# Patient Record
Sex: MALE | Race: WHITE | NOT HISPANIC OR LATINO | Employment: OTHER | URBAN - METROPOLITAN AREA
[De-identification: names, ages, dates, MRNs, and addresses within clinical notes are randomized per-mention and may not be internally consistent; named-entity substitution may affect disease eponyms.]

---

## 2017-04-05 RX ORDER — TERAZOSIN 10 MG/1
CAPSULE ORAL
Qty: 30 CAPSULE | Refills: 5 | Status: SHIPPED | OUTPATIENT
Start: 2017-04-05 | End: 2020-08-04 | Stop reason: ALTCHOICE

## 2020-07-28 ENCOUNTER — OFFICE VISIT (OUTPATIENT)
Dept: GASTROENTEROLOGY | Facility: CLINIC | Age: 70
End: 2020-07-28
Payer: OTHER GOVERNMENT

## 2020-07-28 VITALS
HEART RATE: 58 BPM | HEIGHT: 69 IN | WEIGHT: 248 LBS | SYSTOLIC BLOOD PRESSURE: 131 MMHG | DIASTOLIC BLOOD PRESSURE: 67 MMHG | BODY MASS INDEX: 36.73 KG/M2

## 2020-07-28 DIAGNOSIS — Z86.010 HISTORY OF COLON POLYPS: Primary | ICD-10-CM

## 2020-07-28 DIAGNOSIS — K59.00 CONSTIPATION, UNSPECIFIED CONSTIPATION TYPE: ICD-10-CM

## 2020-07-28 DIAGNOSIS — Z01.818 PRE-OP TESTING: ICD-10-CM

## 2020-07-28 PROCEDURE — 99204 OFFICE O/P NEW MOD 45 MIN: CPT | Mod: S$PBB,,, | Performed by: INTERNAL MEDICINE

## 2020-07-28 PROCEDURE — 99204 PR OFFICE/OUTPT VISIT, NEW, LEVL IV, 45-59 MIN: ICD-10-PCS | Mod: S$PBB,,, | Performed by: INTERNAL MEDICINE

## 2020-07-28 PROCEDURE — 99999 PR PBB SHADOW E&M-EST. PATIENT-LVL III: CPT | Mod: PBBFAC,,, | Performed by: INTERNAL MEDICINE

## 2020-07-28 PROCEDURE — 99999 PR PBB SHADOW E&M-EST. PATIENT-LVL III: ICD-10-PCS | Mod: PBBFAC,,, | Performed by: INTERNAL MEDICINE

## 2020-07-28 PROCEDURE — 99213 OFFICE O/P EST LOW 20 MIN: CPT | Mod: PBBFAC,PO | Performed by: INTERNAL MEDICINE

## 2020-07-28 NOTE — PROGRESS NOTES
"Subjective:       Patient ID: Eron Turk is a 69 y.o. male.    This patient is new to me.    Chief Complaint: Constipation    Patient seen for constipation, onset recent, with stool frequency usually once or twice per day but no BM in the last day or so, mild in severity, with stool form being normal.  Associated signs and symptoms include abdominal bloating and alleviating/exacerbating factors include none.   Last colonoscopy was about 8 years ago per patient and he had some polyps noted.  Per his wife who was present, size and histology of polyps is unknown.  No bleeding, weight changes, or abdominal pain.  No other acute complaints.        Review of Systems   Constitutional: Negative for chills, fatigue and fever.   HENT: Negative for trouble swallowing.    Respiratory: Negative for cough, shortness of breath and wheezing.    Cardiovascular: Negative for chest pain and palpitations.   Gastrointestinal: Positive for constipation (mild, occasional). Negative for abdominal pain, diarrhea, nausea and vomiting.   Musculoskeletal: Negative for arthralgias and myalgias.   Integumentary:  Negative for color change and rash.   Neurological: Negative for dizziness, weakness and numbness.   Psychiatric/Behavioral: Negative for confusion. The patient is not nervous/anxious.    All other systems reviewed and are negative.        Objective:       Vitals:    07/28/20 1330   BP: 131/67   Pulse: (!) 58   Weight: 112.5 kg (248 lb 0.3 oz)   Height: 5' 9" (1.753 m)       Physical Exam  Constitutional:       Appearance: He is well-developed.   HENT:      Head: Normocephalic and atraumatic.   Eyes:      General: No scleral icterus.     Pupils: Pupils are equal, round, and reactive to light.   Neck:      Musculoskeletal: Normal range of motion and neck supple.      Thyroid: No thyromegaly.   Cardiovascular:      Rate and Rhythm: Normal rate and regular rhythm.      Heart sounds: No murmur.   Pulmonary:      Effort: Pulmonary " effort is normal.      Breath sounds: Normal breath sounds. No wheezing.   Abdominal:      General: Bowel sounds are normal. There is no distension.      Palpations: Abdomen is soft.      Tenderness: There is no abdominal tenderness.      Comments: obese   Lymphadenopathy:      Cervical: No cervical adenopathy.   Skin:     General: Skin is warm and dry.      Findings: No erythema or rash.   Neurological:      Mental Status: He is alert and oriented to person, place, and time.   Psychiatric:         Behavior: Behavior normal.         Lab Results   Component Value Date    WBC 8.92 05/02/2016    HGB 12.6 (L) 05/02/2016    HCT 36.9 (L) 05/02/2016    MCV 87 05/02/2016     05/02/2016         CMP  Sodium   Date Value Ref Range Status   05/02/2016 138 136 - 145 mmol/L Final     Potassium   Date Value Ref Range Status   05/02/2016 3.1 (L) 3.5 - 5.1 mmol/L Final     Chloride   Date Value Ref Range Status   05/02/2016 102 95 - 110 mmol/L Final     CO2   Date Value Ref Range Status   05/02/2016 29 23 - 29 mmol/L Final     Glucose   Date Value Ref Range Status   05/02/2016 95 70 - 110 mg/dL Final     BUN, Bld   Date Value Ref Range Status   05/02/2016 13 8 - 23 mg/dL Final     Creatinine   Date Value Ref Range Status   05/02/2016 0.9 0.5 - 1.4 mg/dL Final     Calcium   Date Value Ref Range Status   05/02/2016 8.6 (L) 8.7 - 10.5 mg/dL Final     Total Protein   Date Value Ref Range Status   02/12/2016 6.5 6.0 - 8.4 g/dL Final     Albumin   Date Value Ref Range Status   02/12/2016 3.3 (L) 3.5 - 5.2 g/dL Final     Total Bilirubin   Date Value Ref Range Status   02/12/2016 0.8 0.1 - 1.0 mg/dL Final     Comment:     For infants and newborns, interpretation of results should be based  on gestational age, weight and in agreement with clinical  observations.  Premature Infant recommended reference ranges:  Up to 24 hours.............<8.0 mg/dL  Up to 48 hours............<12.0 mg/dL  3-5 days..................<15.0 mg/dL  6-29  days.................<15.0 mg/dL       Alkaline Phosphatase   Date Value Ref Range Status   02/12/2016 63 55 - 135 U/L Final     AST   Date Value Ref Range Status   02/12/2016 15 10 - 40 U/L Final     ALT   Date Value Ref Range Status   02/12/2016 16 10 - 44 U/L Final     Anion Gap   Date Value Ref Range Status   05/02/2016 7 (L) 8 - 16 mmol/L Final     eGFR if    Date Value Ref Range Status   05/02/2016 >60 >60 mL/min/1.73 m^2 Final     eGFR if non    Date Value Ref Range Status   05/02/2016 >60 >60 mL/min/1.73 m^2 Final     Comment:     Calculation used to obtain the estimated glomerular filtration  rate (eGFR) is the CKD-EPI equation. Since race is unknown   in our information system, the eGFR values for   -American and Non--American patients are given   for each creatinine result.         Further history was obtained from the patient's wife who was present throughout the interview and states that polyp type is unknown.  History is otherwise as above in the HPI.       Assessment:       1. History of colon polyps    2. Constipation, unspecified constipation type        Plan:       1.  Schedule colonoscopy.  Check routine labs day of scope.  2.  PRN stool softener  3.  Further recommendations to follow after above.

## 2020-08-02 ENCOUNTER — LAB VISIT (OUTPATIENT)
Dept: PRIMARY CARE CLINIC | Facility: CLINIC | Age: 70
End: 2020-08-02
Payer: OTHER GOVERNMENT

## 2020-08-02 DIAGNOSIS — Z01.818 PRE-OP TESTING: ICD-10-CM

## 2020-08-02 PROCEDURE — U0003 INFECTIOUS AGENT DETECTION BY NUCLEIC ACID (DNA OR RNA); SEVERE ACUTE RESPIRATORY SYNDROME CORONAVIRUS 2 (SARS-COV-2) (CORONAVIRUS DISEASE [COVID-19]), AMPLIFIED PROBE TECHNIQUE, MAKING USE OF HIGH THROUGHPUT TECHNOLOGIES AS DESCRIBED BY CMS-2020-01-R: HCPCS

## 2020-08-03 ENCOUNTER — TELEPHONE (OUTPATIENT)
Dept: GASTROENTEROLOGY | Facility: CLINIC | Age: 70
End: 2020-08-03

## 2020-08-03 ENCOUNTER — HOSPITAL ENCOUNTER (EMERGENCY)
Facility: HOSPITAL | Age: 70
Discharge: HOME | End: 2020-08-03
Attending: EMERGENCY MEDICINE
Payer: OTHER GOVERNMENT

## 2020-08-03 VITALS
HEART RATE: 81 BPM | BODY MASS INDEX: 36.62 KG/M2 | WEIGHT: 248 LBS | OXYGEN SATURATION: 96 % | DIASTOLIC BLOOD PRESSURE: 77 MMHG | TEMPERATURE: 98 F | SYSTOLIC BLOOD PRESSURE: 168 MMHG | RESPIRATION RATE: 18 BRPM

## 2020-08-03 DIAGNOSIS — R33.9 URINARY RETENTION: Primary | ICD-10-CM

## 2020-08-03 LAB
ANION GAP SERPL CALC-SCNC: 11 MMOL/L (ref 8–16)
BASOPHILS # BLD AUTO: 0.04 K/UL (ref 0–0.2)
BASOPHILS NFR BLD: 0.4 % (ref 0–1.9)
BILIRUB UR QL STRIP: NEGATIVE
BUN SERPL-MCNC: 13 MG/DL (ref 8–23)
CALCIUM SERPL-MCNC: 8.9 MG/DL (ref 8.7–10.5)
CHLORIDE SERPL-SCNC: 106 MMOL/L (ref 95–110)
CLARITY UR: CLEAR
CO2 SERPL-SCNC: 24 MMOL/L (ref 23–29)
COLOR UR: YELLOW
CREAT SERPL-MCNC: 0.9 MG/DL (ref 0.5–1.4)
DIFFERENTIAL METHOD: ABNORMAL
EOSINOPHIL # BLD AUTO: 0 K/UL (ref 0–0.5)
EOSINOPHIL NFR BLD: 0.4 % (ref 0–8)
ERYTHROCYTE [DISTWIDTH] IN BLOOD BY AUTOMATED COUNT: 12.2 % (ref 11.5–14.5)
EST. GFR  (AFRICAN AMERICAN): >60 ML/MIN/1.73 M^2
EST. GFR  (NON AFRICAN AMERICAN): >60 ML/MIN/1.73 M^2
GLUCOSE SERPL-MCNC: 114 MG/DL (ref 70–110)
GLUCOSE UR QL STRIP: NEGATIVE
HCT VFR BLD AUTO: 40.8 % (ref 40–54)
HGB BLD-MCNC: 13.6 G/DL (ref 14–18)
HGB UR QL STRIP: NEGATIVE
IMM GRANULOCYTES # BLD AUTO: 0.08 K/UL (ref 0–0.04)
IMM GRANULOCYTES NFR BLD AUTO: 0.8 % (ref 0–0.5)
KETONES UR QL STRIP: NEGATIVE
LEUKOCYTE ESTERASE UR QL STRIP: NEGATIVE
LYMPHOCYTES # BLD AUTO: 1.5 K/UL (ref 1–4.8)
LYMPHOCYTES NFR BLD: 14.5 % (ref 18–48)
MCH RBC QN AUTO: 31.9 PG (ref 27–31)
MCHC RBC AUTO-ENTMCNC: 33.3 G/DL (ref 32–36)
MCV RBC AUTO: 96 FL (ref 82–98)
MONOCYTES # BLD AUTO: 0.9 K/UL (ref 0.3–1)
MONOCYTES NFR BLD: 8.6 % (ref 4–15)
NEUTROPHILS # BLD AUTO: 7.9 K/UL (ref 1.8–7.7)
NEUTROPHILS NFR BLD: 75.3 % (ref 38–73)
NITRITE UR QL STRIP: NEGATIVE
NRBC BLD-RTO: 0 /100 WBC
PH UR STRIP: 6 [PH] (ref 5–8)
PLATELET # BLD AUTO: 159 K/UL (ref 150–350)
PMV BLD AUTO: 10.4 FL (ref 9.2–12.9)
POTASSIUM SERPL-SCNC: 4.5 MMOL/L (ref 3.5–5.1)
PROT UR QL STRIP: NEGATIVE
RBC # BLD AUTO: 4.26 M/UL (ref 4.6–6.2)
SARS-COV-2 RNA RESP QL NAA+PROBE: NOT DETECTED
SODIUM SERPL-SCNC: 141 MMOL/L (ref 136–145)
SP GR UR STRIP: 1.01 (ref 1–1.03)
URN SPEC COLLECT METH UR: NORMAL
UROBILINOGEN UR STRIP-ACNC: NEGATIVE EU/DL
WBC # BLD AUTO: 10.51 K/UL (ref 3.9–12.7)

## 2020-08-03 PROCEDURE — 99283 EMERGENCY DEPT VISIT LOW MDM: CPT

## 2020-08-03 PROCEDURE — 85025 COMPLETE CBC W/AUTO DIFF WBC: CPT

## 2020-08-03 PROCEDURE — 81003 URINALYSIS AUTO W/O SCOPE: CPT

## 2020-08-03 PROCEDURE — 36415 COLL VENOUS BLD VENIPUNCTURE: CPT

## 2020-08-03 PROCEDURE — 80048 BASIC METABOLIC PNL TOTAL CA: CPT

## 2020-08-03 PROCEDURE — 51702 INSERT TEMP BLADDER CATH: CPT

## 2020-08-03 NOTE — ED PROVIDER NOTES
Encounter Date: 8/3/2020    SCRIBE #1 NOTE: I, Alma Barron, am scribing for, and in the presence of, rBian Guajardo MD.       History     Chief Complaint   Patient presents with    Urinary Retention     beginning yesterday, some incontinence en route     Time seen by provider: 5:46 PM on 2020    Eron Turk is a 69 y.o. male who presents to the ED with an onset of urinary retention for 2 days secondary to an enlarged prostate. He also c/o mild incontinence en route. The patient denies fever or any other symptoms at this time. Pertinent PMHx includes BPH, HTN, and PUD. Pertinent PSHx includes hernia repair. NKDA.      The history is provided by the patient.     Review of patient's allergies indicates:  No Known Allergies  Past Medical History:   Diagnosis Date    Arthritis     BPH (benign prostatic hypertrophy)     Encounter for blood transfusion     Hypertension     PTSD (post-traumatic stress disorder)     PUD (peptic ulcer disease)      Past Surgical History:   Procedure Laterality Date    ANKLE SURGERY Left     HERNIA REPAIR      JOINT REPLACEMENT      right    stomach ulcer surgery       No family history on file.  Social History     Tobacco Use    Smoking status: Former Smoker     Packs/day: 2.00     Years: 13.00     Pack years: 26.00     Quit date: 1979     Years since quittin.5   Substance Use Topics    Alcohol use: Yes     Alcohol/week: 6.0 standard drinks     Types: 6 Cans of beer per week     Comment: occ    Drug use: No     Review of Systems   Constitutional: Negative for fever.   Respiratory: Negative for shortness of breath.    Genitourinary: Positive for difficulty urinating. Negative for flank pain.        Positive for mild incontinence.    Musculoskeletal: Negative for gait problem.   Neurological: Negative for weakness.   Psychiatric/Behavioral: Negative for confusion.       Physical Exam     Initial Vitals [20 1656]   BP Pulse Resp Temp SpO2   (!)  168/77 81 18 97.5 °F (36.4 °C) 96 %      MAP       --         Physical Exam    Nursing note and vitals reviewed.  Constitutional: He appears well-developed and well-nourished.   HENT:   Head: Normocephalic and atraumatic.   Eyes: Conjunctivae are normal.   Neck: Normal range of motion. Neck supple.   Cardiovascular: Normal rate, regular rhythm and normal heart sounds. Exam reveals no gallop and no friction rub.    No murmur heard.  Pulmonary/Chest: Breath sounds normal. No respiratory distress. He has no wheezes. He has no rhonchi. He has no rales.   Abdominal: Soft. He exhibits distension. There is no abdominal tenderness. A hernia is present. Hernia confirmed positive in the ventral area (reducible).   Musculoskeletal: Normal range of motion.   Neurological: He is alert and oriented to person, place, and time.   Skin: Skin is warm and dry.   Psychiatric: He has a normal mood and affect.         ED Course   Procedures  Labs Reviewed   BASIC METABOLIC PANEL - Abnormal; Notable for the following components:       Result Value    Glucose 114 (*)     All other components within normal limits   CBC W/ AUTO DIFFERENTIAL - Abnormal; Notable for the following components:    RBC 4.26 (*)     Hemoglobin 13.6 (*)     Mean Corpuscular Hemoglobin 31.9 (*)     Immature Granulocytes 0.8 (*)     Gran # (ANC) 7.9 (*)     Immature Grans (Abs) 0.08 (*)     Gran% 75.3 (*)     Lymph% 14.5 (*)     All other components within normal limits   URINALYSIS, REFLEX TO URINE CULTURE    Narrative:     Specimen Source->Urine          Imaging Results    None          Medical Decision Making:   History:   Old Medical Records: I decided to obtain old medical records.  Clinical Tests:   Lab Tests: Ordered and Reviewed  ED Management:  69-year-old male presents with urinary retention.  Catheter was placed with greater than 1500 cc of urine.  He has no evidence of renal insufficiency.  There is no evidence of infection.  Catheter remains in place with  referral to Urology.       APC / Resident Notes:   I, Dr. Brian Guajardo III, personally performed the services described in this documentation. All medical record entries made by the scribe were at my direction and in my presence.  I have reviewed the chart and agree that the record reflects my personal performance and is accurate and complete       Scribe Attestation:   Scribe #1: I performed the above scribed service and the documentation accurately describes the services I performed. I attest to the accuracy of the note.                          Clinical Impression:       ICD-10-CM ICD-9-CM   1. Urinary retention  R33.9 788.20         Disposition:   Disposition: Discharged  Condition: Stable                        Brian Guajardo III, MD  08/03/20 1950

## 2020-08-03 NOTE — TELEPHONE ENCOUNTER
Called back and cancelled procedure    ----- Message from Heavenly Alejandra sent at 8/3/2020  4:10 PM CDT -----  Type: Needs Medical Advice  Who Called:  Brielle Hernandez (Significant other)  Best Call Back Number: [phone]  Additional Information: states that the patient has procedure scheduled for 08/05/2020. States that the patient is having issues with his prostate and unable to urinate. Requesting a call back today to r/s. thanks

## 2020-08-04 ENCOUNTER — OFFICE VISIT (OUTPATIENT)
Dept: UROLOGY | Facility: CLINIC | Age: 70
End: 2020-08-04
Payer: OTHER GOVERNMENT

## 2020-08-04 VITALS
BODY MASS INDEX: 35.75 KG/M2 | HEIGHT: 69 IN | DIASTOLIC BLOOD PRESSURE: 78 MMHG | SYSTOLIC BLOOD PRESSURE: 157 MMHG | HEART RATE: 64 BPM | WEIGHT: 241.38 LBS

## 2020-08-04 DIAGNOSIS — N39.41 URGE INCONTINENCE: ICD-10-CM

## 2020-08-04 DIAGNOSIS — R33.9 URINARY RETENTION: Primary | ICD-10-CM

## 2020-08-04 DIAGNOSIS — K59.00 CONSTIPATION, UNSPECIFIED CONSTIPATION TYPE: ICD-10-CM

## 2020-08-04 DIAGNOSIS — N40.0 BENIGN NON-NODULAR PROSTATIC HYPERPLASIA WITHOUT LOWER URINARY TRACT SYMPTOMS: ICD-10-CM

## 2020-08-04 PROCEDURE — 99204 OFFICE O/P NEW MOD 45 MIN: CPT | Mod: S$PBB,25,, | Performed by: UROLOGY

## 2020-08-04 PROCEDURE — 99214 OFFICE O/P EST MOD 30 MIN: CPT | Mod: PBBFAC,PN,25 | Performed by: UROLOGY

## 2020-08-04 PROCEDURE — 81002 URINALYSIS NONAUTO W/O SCOPE: CPT | Mod: PBBFAC,PN | Performed by: UROLOGY

## 2020-08-04 PROCEDURE — 99204 PR OFFICE/OUTPT VISIT, NEW, LEVL IV, 45-59 MIN: ICD-10-PCS | Mod: S$PBB,25,, | Performed by: UROLOGY

## 2020-08-04 PROCEDURE — 99999 PR PBB SHADOW E&M-EST. PATIENT-LVL IV: ICD-10-PCS | Mod: PBBFAC,,, | Performed by: UROLOGY

## 2020-08-04 PROCEDURE — 51700 IRRIGATION OF BLADDER: CPT | Mod: S$PBB,,, | Performed by: UROLOGY

## 2020-08-04 PROCEDURE — 51700 PR IRRIGATION, BLADDER: ICD-10-PCS | Mod: S$PBB,,, | Performed by: UROLOGY

## 2020-08-04 PROCEDURE — 99999 PR PBB SHADOW E&M-EST. PATIENT-LVL IV: CPT | Mod: PBBFAC,,, | Performed by: UROLOGY

## 2020-08-04 PROCEDURE — 51700 IRRIGATION OF BLADDER: CPT | Mod: PBBFAC,PN | Performed by: UROLOGY

## 2020-08-04 RX ORDER — LIDOCAINE HYDROCHLORIDE 20 MG/ML
JELLY TOPICAL
Qty: 30 ML | Refills: 0 | Status: SHIPPED | OUTPATIENT
Start: 2020-08-04

## 2020-08-04 RX ORDER — TAMSULOSIN HYDROCHLORIDE 0.4 MG/1
0.8 CAPSULE ORAL
Qty: 180 CAPSULE | Refills: 3 | Status: ON HOLD | OUTPATIENT
Start: 2020-08-04 | End: 2020-09-14 | Stop reason: HOSPADM

## 2020-08-04 NOTE — PATIENT INSTRUCTIONS
fiber gummies. Take 2 fiber gummies a day for constipation.    ky jelly/vaseline/neosporin and apply to catheter tip twice a day. Clean off twice a day.   Discontinue oxybutynin/ditropan   Discontinue terazosin  Start tamsulosin 0.8mg (2 pills)before bedtime to help relax prostate  Take amlodipine in the morning (do not take with tamsulosin).    Return next Monday for voiding trial. Wife will cut across catheter where it is one tube. She will do this 9 hours before her appt/nurse visit for residual. Steph will call with time and we will check the residual here. Either he has to void here and we will check what's left or if he can't void, then we will check residual.    If the residual is >300 need another catheter. Wife here, says he wont' let her do cic so will need indwelling catheter until he can have a cystoscopy and trus and possible urodynamics    Also needs a psa this week. May be elevated bc of catheter but at least need baseline.     F/u in 2 months with me with pvr and likely psa  If no follow-ups are made, call office and make a f/u   Needs colonoscopy then we can focus on prostate again     Post Op Catheter Care:   · While the catheter is in clean the tip of the penis and the catheter, where it comes out of the penis, at least once daily and up to 2-3x a day and apply lidocaine jelly or Ky Jelly up to 4x a day to where the catheter exits the penis.   · It is ok to disconnect the sharma from the tubing prior to taking a shower but before reconnecting wipe each end with an alcohol wipe.   · Expect blood in urine with the catheter, especially with movement or if you are straining to have a bowel movement.  · Try not to let the catheter pull or tug.

## 2020-08-04 NOTE — PROGRESS NOTES
[facility] Progress Note - [place]  [facility]  [facility]: inactive    Chief Complaint: Hospital Follow Up (sharma cath) and Urinary Retention      Subjective:        HPI: Eron Turk is a 69 y.o. male presents with     Interval history 10/14/16:     He recently moved from [place]. He found a testicular lump and underwent a scrotal US at [facility]. He was told he had a cyst in his scroum and on last visit he was found to have an epididymal cyst.      He also c/o occ weak stream. occ hesisistancy. He has frequency q1-2 hours. Nocturia 5x a night. He is on hytrin which he's been on for the past 10 years. occ straining. He has occ UUI. He does not feel like he empties his bladder. i started ditropan at last visit and he states that he is happy with his symptoms. Feels like he is able to hold his urine, denies any leakage. Nocturia decreased to 2-3x a night. Frequency q3-4 hours.     I scheduled him for cysto/trus on 9/12/16. Volume 33.7g and he was found to have an enlarged median lobe. Mod bilobar hypertrophy.     Uroflow results : Mean flow: 4mL/s. Voided volume: 232 mL. PVR 174cc     Plan: pt not interested in suregery. cont ditropan, continue terazosin. F/u in 6 months.     Interval history 8/4/20:     He went to ER with 2d history of urinary retention associated with full bladder and pain with urge. Has been on terazosin and ditropan 10mg XL. He was supposed to f/u in 2016 but they didn't know. He's been having increasing urgency and frequency with UUI requiring depends. Nocturia 4-5x. Wearing 3 depends a day.    psa history - no family hx of prostate cancer  8/3/20  Cath residual: 1500cc., 8/4/20: difficult to palpate prostate  10/14/16 UF: avg flow 4mL/s, voided vol: 232, pvr: 174cc  9/12/16 Cysto/trus: 33.7g   8/16/16 0.87    Urine history:  8/3/20 Cath: neg       Past Medical History:   Diagnosis Date    Arthritis     BPH (benign prostatic hypertrophy)     Encounter for  blood transfusion     Hypertension     PTSD (post-traumatic stress disorder)     PUD (peptic ulcer disease)      Past Surgical History:   Procedure Laterality Date    ANKLE SURGERY Left     HERNIA REPAIR      JOINT REPLACEMENT      right    stomach ulcer surgery         Cardiology: none  Colonoscopy:      [facility] Hx:   malignancies: No  . Mother  a 86. Father  a 90  kidney stones: Yes - little brother      Soc Hx:  No tobacco.  5 to 6 beers per day - alcohol  [place] in [place]  :unmarried but with partner Brielle  Children: 3  Occupation: and      Allergies:  Review of patient's allergies indicates no known allergies.     Medications: reviewed   Anticoagulation: Yes - asa 81mg daily    Urologic meds: ditropan, hytrin 10mg  Anticoagulation: Yes - asa 81mg    Objective:     Vitals:    20 1613   BP: (!) 157/78   Pulse: 64       Physical Exam:  General:WDWN in NAD  Neurologic: CN grossly normal. Normal sensation.   Psychiatric: awake, alert and oriented x 3. Mood and affect normal. Cooperative.  Eyes: PERRLA, normal conjunctiva  Respiratory: no increased work on breathing. No wheezing.   Cardiovascular: No obvious extremity edema. Warm and well perfused.  GI: no obvious stomach distension  Musculoskeletal: normal range of motion of bilateral upper extremities. Normal muscle strength and tone.  Skin: no obvious rashes or lesions. No tightening of skin noted.    Repositioned catheter stat lock and irrigated to make sure draining    Labs:  Recent Labs   Lab 20  1837   WBC 10.51   Hemoglobin 13.6 L   Hematocrit 40.8   Platelets 159   ]  Recent Labs   Lab 20  1837   Sodium 141   Potassium 4.5   Chloride 106   CO2 24   BUN, Bld 13   Creatinine 0.9   Glucose 114 H   Calcium 8.9   ]    Lab Results   Component Value Date    HGBA1C 5.6 2016       Path: See hpi for recent   none    Rads: See hpi for recent   none    Assessment:       1.  Urinary retention    2. Benign non-nodular prostatic hyperplasia without lower urinary tract symptoms    3. Constipation, unspecified constipation type    4. Urge incontinence        Plan:     Retention likely from bph, constipation and oab med     fiber gummies. Take 2 fiber gummies a day for constipation.    ky jelly/vaseline/neosporin and apply to catheter tip twice a day. Clean off twice a day.   Discontinue oxybutynin/ditropan    Discontinue terazosin  Start tamsulosin 0.8mg (2 pills)before bedtime to help relax prostate  Take amlodipine in the morning (do not take with tamsulosin).    Return next Monday for voiding trial. Wife will cut across catheter where it is one tube. She will do this 9 hours before her appt/nurse visit for residual. Steph will call with time and we will check the residual here. Either he has to void here and we will check what's left or if he can't void, then we will check residual.    If the residual is >300 need another catheter. Wife here, says he wont' let her do cic so will need indwelling catheter until he can have a cystoscopy and trus and possible urodynamics    Also needs a psa this week. May be elevated bc of catheter but at least need baseline.     Will likely need prostate procedure. With urge to void suspect his obstruction from his prostate. Will need a repeat cysto trus prior to any procedure. May need urodynamics as well if prostate small on cysto.     F/u in 1 months with me with pvr and likely psa  If no follow-ups are made, call office and make a f/u   Needs colonoscopy then we can focus on prostate again

## 2020-08-04 NOTE — Clinical Note
Psa this week.  Call pt and give them time on Monday pm for pvr  She will remove catheter at home 9 hours prior   F/u in 1 month with me pvr

## 2020-08-04 NOTE — ED NOTES
Leg bag attached to pt sharma catheter and pt educated on care and need for F/U.  Pt acknowledged understanding and acceptance.

## 2020-08-10 ENCOUNTER — CLINICAL SUPPORT (OUTPATIENT)
Dept: UROLOGY | Facility: CLINIC | Age: 70
End: 2020-08-10
Payer: OTHER GOVERNMENT

## 2020-08-10 ENCOUNTER — APPOINTMENT (OUTPATIENT)
Dept: LAB | Facility: HOSPITAL | Age: 70
End: 2020-08-10
Attending: UROLOGY
Payer: OTHER GOVERNMENT

## 2020-08-10 DIAGNOSIS — R33.9 URINARY RETENTION: Primary | ICD-10-CM

## 2020-08-10 LAB
BACTERIA #/AREA URNS HPF: ABNORMAL /HPF
BILIRUB UR QL STRIP: NEGATIVE
CLARITY UR: CLEAR
COLOR UR: YELLOW
GLUCOSE UR QL STRIP: NEGATIVE
HGB UR QL STRIP: ABNORMAL
HYALINE CASTS #/AREA URNS LPF: 0 /LPF
KETONES UR QL STRIP: NEGATIVE
LEUKOCYTE ESTERASE UR QL STRIP: ABNORMAL
MICROSCOPIC COMMENT: ABNORMAL
NITRITE UR QL STRIP: NEGATIVE
PH UR STRIP: 6 [PH] (ref 5–8)
POC RESIDUAL URINE VOLUME: 287 ML (ref 0–100)
PROT UR QL STRIP: ABNORMAL
RBC #/AREA URNS HPF: >100 /HPF (ref 0–4)
SP GR UR STRIP: 1.02 (ref 1–1.03)
SQUAMOUS #/AREA URNS HPF: 2 /HPF
URN SPEC COLLECT METH UR: ABNORMAL
UROBILINOGEN UR STRIP-ACNC: NEGATIVE EU/DL
WBC #/AREA URNS HPF: 18 /HPF (ref 0–5)

## 2020-08-10 PROCEDURE — 51703 PR INSERTION OF TEMPORARY INDWELLING BLADDER CATHETERIZATION, COMPLICA: ICD-10-PCS | Mod: S$PBB,,, | Performed by: UROLOGY

## 2020-08-10 PROCEDURE — 87086 URINE CULTURE/COLONY COUNT: CPT

## 2020-08-10 PROCEDURE — 51703 INSERT BLADDER CATH COMPLEX: CPT | Mod: S$PBB,,, | Performed by: UROLOGY

## 2020-08-10 PROCEDURE — 99499 UNLISTED E&M SERVICE: CPT | Mod: S$PBB,,, | Performed by: UROLOGY

## 2020-08-10 PROCEDURE — 87077 CULTURE AEROBIC IDENTIFY: CPT

## 2020-08-10 PROCEDURE — 87088 URINE BACTERIA CULTURE: CPT

## 2020-08-10 PROCEDURE — 51703 INSERT BLADDER CATH COMPLEX: CPT | Mod: PBBFAC,PN

## 2020-08-10 PROCEDURE — 99499 NO LOS: ICD-10-PCS | Mod: S$PBB,,, | Performed by: UROLOGY

## 2020-08-10 PROCEDURE — 51798 US URINE CAPACITY MEASURE: CPT | Mod: PBBFAC,PN

## 2020-08-10 PROCEDURE — 81000 URINALYSIS NONAUTO W/SCOPE: CPT

## 2020-08-10 PROCEDURE — 87186 SC STD MICRODIL/AGAR DIL: CPT

## 2020-08-10 NOTE — PROGRESS NOTES
Catheter removed at 6am  Has urge to void  pvr by scan: 287  denise will place catheter and record output    Pt will need:  psa this week (Friday preferably)  Cath ua sent for ua reflexive today  F/u next thurs or following thurs  For a video visit to discuss plan

## 2020-08-10 NOTE — PROGRESS NOTES
Catheter removed this am at 6:00 with no difficulties.   Patient has not been able to urinate since catheter removed.    Patient cannot urinate now.    Bladder scan is 287ml  Reported to Dr. Dowell.    Written orders to place 16Fr coude catheter.    Send cath urine for ua/reflex  VV in 1-2 weeks.        Patient draped and prepped in sterile fashion.   16Fr coude catheter placed into the bladder with no difficulty.    Balloon filled with 10ml saline  Bladder drained of 350ml tea colored urine.  Catheter attached to leg bag.    Leg bag secured to right leg with velcro.    Scheduled VV.      Patient left the office in satisfactory condition.

## 2020-08-12 ENCOUNTER — LAB VISIT (OUTPATIENT)
Dept: LAB | Facility: HOSPITAL | Age: 70
End: 2020-08-12
Attending: UROLOGY
Payer: OTHER GOVERNMENT

## 2020-08-12 DIAGNOSIS — N40.0 BENIGN NON-NODULAR PROSTATIC HYPERPLASIA WITHOUT LOWER URINARY TRACT SYMPTOMS: ICD-10-CM

## 2020-08-12 LAB — COMPLEXED PSA SERPL-MCNC: 0.64 NG/ML (ref 0–4)

## 2020-08-12 PROCEDURE — 84153 ASSAY OF PSA TOTAL: CPT

## 2020-08-12 PROCEDURE — 36415 COLL VENOUS BLD VENIPUNCTURE: CPT

## 2020-08-13 LAB — BACTERIA UR CULT: ABNORMAL

## 2020-08-19 ENCOUNTER — TELEPHONE (OUTPATIENT)
Dept: UROLOGY | Facility: CLINIC | Age: 70
End: 2020-08-19

## 2020-08-19 ENCOUNTER — DOCUMENTATION ONLY (OUTPATIENT)
Dept: UROLOGY | Facility: CLINIC | Age: 70
End: 2020-08-19

## 2020-08-19 NOTE — TELEPHONE ENCOUNTER
----- Message from Shira Burt sent at 8/19/2020 10:49 AM CDT -----  Regarding: appointment  Contact: wife  Type: Needs Medical Advice  Who Called:  wife-Brielle Hernandez  Symptoms (please be specific):    How long has patient had these symptoms:    Pharmacy name and phone #:    Best Call Back Number: [phone]  Additional Information: Patient's wife requesting to change VV to in office visit patient has a catheter

## 2020-08-19 NOTE — TELEPHONE ENCOUNTER
Spoke with patient's wife informed her per  patient should have virtual visit tomorrow or next Thursday to discuss plan. Wife verbally voiced understanding.

## 2020-08-20 ENCOUNTER — OFFICE VISIT (OUTPATIENT)
Dept: UROLOGY | Facility: CLINIC | Age: 70
End: 2020-08-20
Payer: COMMERCIAL

## 2020-08-20 ENCOUNTER — TELEPHONE (OUTPATIENT)
Dept: UROLOGY | Facility: CLINIC | Age: 70
End: 2020-08-20

## 2020-08-20 DIAGNOSIS — N40.0 BENIGN NON-NODULAR PROSTATIC HYPERPLASIA WITHOUT LOWER URINARY TRACT SYMPTOMS: Primary | ICD-10-CM

## 2020-08-20 DIAGNOSIS — R33.9 URINARY RETENTION: ICD-10-CM

## 2020-08-20 DIAGNOSIS — Z01.818 PREOP EXAMINATION: ICD-10-CM

## 2020-08-20 PROCEDURE — 99213 PR OFFICE/OUTPT VISIT, EST, LEVL III, 20-29 MIN: ICD-10-PCS | Mod: 95,,, | Performed by: UROLOGY

## 2020-08-20 PROCEDURE — 99213 OFFICE O/P EST LOW 20 MIN: CPT | Mod: 95,,, | Performed by: UROLOGY

## 2020-08-20 RX ORDER — LIDOCAINE HYDROCHLORIDE 20 MG/ML
JELLY TOPICAL ONCE
Status: CANCELLED | OUTPATIENT
Start: 2020-08-20 | End: 2020-08-20

## 2020-08-20 RX ORDER — NITROFURANTOIN (MACROCRYSTALS) 100 MG/1
100 CAPSULE ORAL EVERY 12 HOURS
Qty: 14 CAPSULE | Refills: 0 | Status: SHIPPED | OUTPATIENT
Start: 2020-08-20 | End: 2020-08-20 | Stop reason: SDUPTHER

## 2020-08-20 RX ORDER — NITROFURANTOIN (MACROCRYSTALS) 100 MG/1
100 CAPSULE ORAL EVERY 12 HOURS
Qty: 14 CAPSULE | Refills: 0 | Status: SHIPPED | OUTPATIENT
Start: 2020-08-20 | End: 2020-08-24 | Stop reason: ALTCHOICE

## 2020-08-20 NOTE — TELEPHONE ENCOUNTER
----- Message from Mary Hebert sent at 8/20/2020  3:23 PM CDT -----  Pt called stating no one reached out to him for his virtual appointment please reach out to pt at [phone]

## 2020-08-20 NOTE — PATIENT INSTRUCTIONS
He tried to void just 2 days after starting flomax 0.8mg  Recommend one more voiding trial and then schedule cysto trus to evaluate prostate size this Monday August 24th.   Will give voiding trial on Monday at time of procedure. If he cannot void or empty will need to replace catheter.   With the urge to urinate I suspect bph    preop covid 3d prior  (AUGUST 20)  Start macrobid twice a day for 7 days- prescription has to be handwritten. Sent to [facility]

## 2020-08-20 NOTE — PROGRESS NOTES
The patient location is: home  Date of Service: 08/20/2020   The chief complaint leading to consultation is: see hpi and visit diagnosis  Visit type: Virtual visit with Real Time synchronous AUDIO and VIDEO     Each patient to whom he or she provides medical services by telemedicine is:    (1) informed of the relationship between the physician and patient and the respective role of any other health care provider with respect to management of the patient; and   (2) notified that he or she may decline to receive medical services by telemedicine and may withdraw from such care at any time.  * due to inability to access [employer] VV platform, video/audio provided via HIPPA-compliant mig33 video call platform      [facility] - [place]  [facility]    Chief Complaint: No chief complaint on file.      Subjective:        HPI: Eorn Turk is a 69 y.o. male presents with     Interval history 10/14/16:     He recently moved from [place]. He found a testicular lump and underwent a scrotal US at [facility]. He was told he had a cyst in his scroum and on last visit he was found to have an epididymal cyst.      He also c/o occ weak stream. occ hesisistancy. He has frequency q1-2 hours. Nocturia 5x a night. He is on hytrin which he's been on for the past 10 years. occ straining. He has occ UUI. He does not feel like he empties his bladder. i started ditropan at last visit and he states that he is happy with his symptoms. Feels like he is able to hold his urine, denies any leakage. Nocturia decreased to 2-3x a night. Frequency q3-4 hours.     I scheduled him for cysto/trus on 9/12/16. Volume 33.7g and he was found to have an enlarged median lobe. Mod bilobar hypertrophy.     Uroflow results : Mean flow: 4mL/s. Voided volume: 232 mL. PVR 174cc     Plan: pt not interested in suregery. cont ditropan, continue terazosin. F/u in 6 months.     Interval history  20:     He went to ER with 2d history of urinary retention associated with full bladder and pain with urge. Has been on terazosin and ditropan 10mg XL. He was supposed to f/u in 2016 but they didn't know. He's been having increasing urgency and frequency with UUI requiring depends. Nocturia 4-5x. Wearing 3 depends a day.    Plan: felt retention due to bph, constipation and oab med. rec'd fiber gummies, d/c ditropan, ,d/c terazosin, sart flomax 0.8mg and return for voiding trial. psa (-0.64)    Returned on  for voiding trial. He had urge to void but could not and pvr by scan was 287. Catheter placed and 300cc drained.     Interval history 20:   Has been having urge to void    psa history - no family hx of prostate cancer  20 0.64  8/3/20  Cath residual: 1500cc., 20: difficult to palpate prostate  10/14/16 UF: avg flow 4mL/s, voided vol: 232, pvr: 174cc  16 Cysto/trus: 33.7g   16 0.87    Urine history:  8/3/20 Cath: neg       Past Medical History:   Diagnosis Date    Arthritis     BPH (benign prostatic hypertrophy)     Encounter for blood transfusion     Hypertension     PTSD (post-traumatic stress disorder)     PUD (peptic ulcer disease)      Past Surgical History:   Procedure Laterality Date    ANKLE SURGERY Left     HERNIA REPAIR      JOINT REPLACEMENT      right    stomach ulcer surgery         Cardiology: none  Colonoscopy:      [facility] Hx:   malignancies: No  . Mother  a 86. Father  a 90  kidney stones: Yes - little brother      Soc Hx:  No tobacco.  5 to 6 beers per day - alcohol  [place] in [place]  :unmarried but with partner Brielle  Children: 3  Occupation: and      Allergies:  Review of patient's allergies indicates no known allergies.     Medications: reviewed   Anticoagulation: Yes - asa 81mg daily    Urologic meds: ditropan, hytrin 10mg  Anticoagulation: Yes - asa 81mg    Objective:     There were no  vitals filed for this visit.    No vitals or exam beyond what is listed below performed due to virtual visit (COVID)  Neuro: awake, alert and oriented  Musculoskeletal: normal range of motion      Labs:  Recent Labs   Lab 08/03/20  1837   WBC 10.51   Hemoglobin 13.6 L   Hematocrit 40.8   Platelets 159   ]  Recent Labs   Lab 08/03/20  1837   Sodium 141   Potassium 4.5   Chloride 106   CO2 24   BUN, Bld 13   Creatinine 0.9   Glucose 114 H   Calcium 8.9   ]    Lab Results   Component Value Date    HGBA1C 5.6 02/12/2016       Path: See hpi for recent   none    Rads: See hpi for recent   none    Assessment:       1. Benign non-nodular prostatic hyperplasia without lower urinary tract symptoms    2. Urinary retention        Plan:     He tried to void just 2 days after starting flomax 0.8mg  Recommend one more voiding trial and then schedule cysto trus to evaluate prostate size this Monday August 24th.   Will give voiding trial on Monday at time of procedure. If he cannot void or empty will need to replace catheter.   With the urge to urinate I suspect bph    preop covid 3d prior  (AUGUST 20)  Start macrobid twice a day for 7 days-[facility] to [facility]

## 2020-08-20 NOTE — TELEPHONE ENCOUNTER
Spoke with patient's wife she states patient did receive message from provider for virtual visit. Apologized for inconvenience and informed her patient will do a doximity visit with patient shortly. Power was out and unable to establish connection for visit. Wife verbally voiced understanding. Wife states patient tried to remove catheter his self but was unsuccessful. Informed her patient should not remove catheter can cause damage if removed incorrectly. Verbally voiced understanding.

## 2020-08-21 ENCOUNTER — LAB VISIT (OUTPATIENT)
Dept: PRIMARY CARE CLINIC | Facility: CLINIC | Age: 70
End: 2020-08-21
Payer: OTHER GOVERNMENT

## 2020-08-21 ENCOUNTER — TELEPHONE (OUTPATIENT)
Dept: UROLOGY | Facility: CLINIC | Age: 70
End: 2020-08-21

## 2020-08-21 DIAGNOSIS — Z01.818 PREOP EXAMINATION: ICD-10-CM

## 2020-08-21 PROCEDURE — U0003 INFECTIOUS AGENT DETECTION BY NUCLEIC ACID (DNA OR RNA); SEVERE ACUTE RESPIRATORY SYNDROME CORONAVIRUS 2 (SARS-COV-2) (CORONAVIRUS DISEASE [COVID-19]), AMPLIFIED PROBE TECHNIQUE, MAKING USE OF HIGH THROUGHPUT TECHNOLOGIES AS DESCRIBED BY CMS-2020-01-R: HCPCS

## 2020-08-21 NOTE — TELEPHONE ENCOUNTER
Contacted by Shana Rene, patient access, that patient is scheduled for a procedure Monday with Dr. Dowell, but there is not a [facility] referral on file for Urology.  Patient would have to pay out of pocket if no referral, unless procedure is medically urgent.    Patient contacted to advise of this.  [facility].  If not approved, will have to postpone.

## 2020-08-21 NOTE — TELEPHONE ENCOUNTER
----- Message from Joanna Dowell MD sent at 8/20/2020  5:35 PM CDT -----  Regarding: needs [facility] trus at [facility] monday aug 24 and preop covid friday aug 21    He tried to void just 2 days after starting flomax 0.8mg  Recommend one more voiding trial and then schedule cysto trus to evaluate prostate size this Monday August 24th.   Will give voiding trial on Monday at time of procedure. If he cannot void or empty will need to replace catheter.   With the urge to urinate I suspect bph    preop covid 3d prior  (AUGUST 20)  Start macrobid twice a day for 7 days- prescription has to be handwritten. Sent to [facility]

## 2020-08-22 LAB — SARS-COV-2 RNA RESP QL NAA+PROBE: NOT DETECTED

## 2020-08-24 ENCOUNTER — TELEPHONE (OUTPATIENT)
Dept: UROLOGY | Facility: CLINIC | Age: 70
End: 2020-08-24

## 2020-08-24 DIAGNOSIS — R33.9 URINARY RETENTION: Primary | ICD-10-CM

## 2020-08-24 RX ORDER — NITROFURANTOIN (MACROCRYSTALS) 100 MG/1
100 CAPSULE ORAL EVERY 12 HOURS
Qty: 10 CAPSULE | Refills: 0 | Status: ON HOLD | OUTPATIENT
Start: 2020-09-09 | End: 2020-09-14 | Stop reason: HOSPADM

## 2020-08-24 NOTE — TELEPHONE ENCOUNTER
Pt was supposed to have a voiding trial today  He can do another voiding trial tomorrow  He can remove the catheter by cutting across with big scissors (this will release fluid from the balloon) from where it changes from 1 tube into two, waiting one minute then removing the catheter'    He can do this tonight at midnight  Then come in tomorrow morning around 9am for a pvr by in and out cath with 16fr coude indwelling in case it needs to stay    Prior to placing catheter ask pt if he feels full  If residual >250cc then needs to stay.     If he cannot void can either learn CIC with 16fr coude (to be done every 4-6 hours during waking hours) or keep sharma    He can DISCONTINUE THE macrobid  And restart macrobid 5 days prior to his reschedule cysto/trus on sept 14th  Make sure preop covid rescheduled as well

## 2020-08-24 NOTE — TELEPHONE ENCOUNTER
Spoke with patient informed him of recommendations. Patient scheduled for tomorrow at 9am for nurse visit. Patient verbally voiced understanding.

## 2020-08-24 NOTE — TELEPHONE ENCOUNTER
Spoke with patient he does not have a [facility] referral on file and [facility] will not [facility] if he does not have one. [facility]. Patient will [facility] to retrieve [facility] from [facility]. Procedure rescheduled to 9/14. Patient wants to know what he can do about his catheter. Please advise

## 2020-08-25 ENCOUNTER — CLINICAL SUPPORT (OUTPATIENT)
Dept: UROLOGY | Facility: CLINIC | Age: 70
End: 2020-08-25
Payer: OTHER GOVERNMENT

## 2020-08-25 DIAGNOSIS — R33.9 URINARY RETENTION: Primary | ICD-10-CM

## 2020-08-25 PROCEDURE — 99499 UNLISTED E&M SERVICE: CPT | Mod: S$PBB,,, | Performed by: UROLOGY

## 2020-08-25 PROCEDURE — 99499 NO LOS: ICD-10-PCS | Mod: S$PBB,,, | Performed by: UROLOGY

## 2020-08-25 RX ORDER — FINASTERIDE 5 MG/1
5 TABLET, FILM COATED ORAL DAILY
Qty: 90 TABLET | Refills: 0 | Status: SHIPPED | OUTPATIENT
Start: 2020-08-25 | End: 2020-08-25 | Stop reason: SDUPTHER

## 2020-08-25 RX ORDER — FINASTERIDE 5 MG/1
5 TABLET, FILM COATED ORAL DAILY
Qty: 90 TABLET | Refills: 0 | Status: SHIPPED | OUTPATIENT
Start: 2020-08-25 | End: 2020-11-05 | Stop reason: ALTCHOICE

## 2020-08-25 NOTE — TELEPHONE ENCOUNTER
Please ask pt to also start finasteride in addition to his flomax 0.4mg  This will help keep the prostate from growing and prepare it for a procedure to help him urinate again.     Confirm he is going to restarrt the macrobid 5d prior to his cysto trus

## 2020-08-25 NOTE — PROGRESS NOTES
Patient arrived in clinic this morning for pvr by in and out catheter.   Patient removed catheter at midnight and has not been able to urinate since catheter removed.  Patient arrived in a lot of pain ambulated to restroom unable to urinate.  Patient did feel full.  PVR by bladder scan showed 878ml of urine in bladder.  16F sharma coude catheter placed draining 700ml of clear yellow urine.   Sharma left in place  Balloon inflated with 10ml of sterile water.  Leg bag and STAT lock attached to right leg.  Patient left clinic in satisfactory and felt relief once catheter placed.  Will follow up on 9/14 for TRUS/CYSTO at [facility].

## 2020-08-25 NOTE — TELEPHONE ENCOUNTER
Spoke with patient informed him of recommendations. Patient verbally voiced understanding. Please resend prescription to [facility]

## 2020-09-11 ENCOUNTER — LAB VISIT (OUTPATIENT)
Dept: PRIMARY CARE CLINIC | Facility: CLINIC | Age: 70
End: 2020-09-11
Payer: OTHER GOVERNMENT

## 2020-09-11 DIAGNOSIS — R33.9 URINARY RETENTION: ICD-10-CM

## 2020-09-11 PROCEDURE — U0003 INFECTIOUS AGENT DETECTION BY NUCLEIC ACID (DNA OR RNA); SEVERE ACUTE RESPIRATORY SYNDROME CORONAVIRUS 2 (SARS-COV-2) (CORONAVIRUS DISEASE [COVID-19]), AMPLIFIED PROBE TECHNIQUE, MAKING USE OF HIGH THROUGHPUT TECHNOLOGIES AS DESCRIBED BY CMS-2020-01-R: HCPCS

## 2020-09-12 LAB — SARS-COV-2 RNA RESP QL NAA+PROBE: NOT DETECTED

## 2020-09-14 ENCOUNTER — HOSPITAL ENCOUNTER (OUTPATIENT)
Facility: AMBULARY SURGERY CENTER | Age: 70
Discharge: HOME | End: 2020-09-14
Attending: UROLOGY | Admitting: UROLOGY
Payer: OTHER GOVERNMENT

## 2020-09-14 VITALS
DIASTOLIC BLOOD PRESSURE: 85 MMHG | RESPIRATION RATE: 18 BRPM | WEIGHT: 248 LBS | TEMPERATURE: 98 F | SYSTOLIC BLOOD PRESSURE: 167 MMHG | OXYGEN SATURATION: 98 % | BODY MASS INDEX: 36.73 KG/M2 | HEART RATE: 72 BPM | HEIGHT: 69 IN

## 2020-09-14 DIAGNOSIS — R33.9 URINARY RETENTION: Primary | ICD-10-CM

## 2020-09-14 DIAGNOSIS — R33.9 URINARY RETENTION: ICD-10-CM

## 2020-09-14 DIAGNOSIS — N40.0 BENIGN NON-NODULAR PROSTATIC HYPERPLASIA WITHOUT LOWER URINARY TRACT SYMPTOMS: ICD-10-CM

## 2020-09-14 DIAGNOSIS — N40.0 BPH (BENIGN PROSTATIC HYPERPLASIA): ICD-10-CM

## 2020-09-14 DIAGNOSIS — Z01.818 PREOP EXAMINATION: ICD-10-CM

## 2020-09-14 LAB
BILIRUB SERPL-MCNC: NORMAL MG/DL
BLOOD URINE, POC: NORMAL
CLARITY, POC UA: NORMAL
COLOR, POC UA: YELLOW
GLUCOSE UR QL STRIP: NORMAL
KETONES UR QL STRIP: NORMAL
LEUKOCYTE ESTERASE URINE, POC: NORMAL
NITRITE, POC UA: NORMAL
PH, POC UA: 5
PROTEIN, POC: NORMAL
SPECIFIC GRAVITY, POC UA: 1.01
UROBILINOGEN, POC UA: NORMAL

## 2020-09-14 PROCEDURE — 88305 TISSUE EXAM BY PATHOLOGIST: CPT | Performed by: PATHOLOGY

## 2020-09-14 PROCEDURE — 52204 CYSTOSCOPY W/BIOPSY(S): CPT | Performed by: UROLOGY

## 2020-09-14 PROCEDURE — 88305 TISSUE EXAM BY PATHOLOGIST: ICD-10-PCS | Mod: 26,,, | Performed by: PATHOLOGY

## 2020-09-14 PROCEDURE — 76872 US TRANSRECTAL: CPT | Performed by: UROLOGY

## 2020-09-14 PROCEDURE — 87086 URINE CULTURE/COLONY COUNT: CPT

## 2020-09-14 PROCEDURE — 76872 PR US TRANSRECTAL: ICD-10-PCS | Mod: 26,,, | Performed by: UROLOGY

## 2020-09-14 PROCEDURE — 87077 CULTURE AEROBIC IDENTIFY: CPT

## 2020-09-14 PROCEDURE — 76872 US TRANSRECTAL: CPT | Mod: 26,,, | Performed by: UROLOGY

## 2020-09-14 PROCEDURE — 52204 PR CYSTOURETHROSCOPY,BIOPSY: ICD-10-PCS | Mod: ,,, | Performed by: UROLOGY

## 2020-09-14 PROCEDURE — 52204 CYSTOSCOPY W/BIOPSY(S): CPT | Mod: ,,, | Performed by: UROLOGY

## 2020-09-14 PROCEDURE — 88305 TISSUE EXAM BY PATHOLOGIST: CPT | Mod: 26,,, | Performed by: PATHOLOGY

## 2020-09-14 PROCEDURE — 87088 URINE BACTERIA CULTURE: CPT

## 2020-09-14 PROCEDURE — 87186 SC STD MICRODIL/AGAR DIL: CPT

## 2020-09-14 RX ORDER — WATER 1 ML/ML
IRRIGANT IRRIGATION
Status: DISCONTINUED | OUTPATIENT
Start: 2020-09-14 | End: 2020-09-14 | Stop reason: HOSPADM

## 2020-09-14 RX ORDER — LIDOCAINE HYDROCHLORIDE 20 MG/ML
JELLY TOPICAL
Status: DISCONTINUED
Start: 2020-09-14 | End: 2020-09-14 | Stop reason: HOSPADM

## 2020-09-14 NOTE — DISCHARGE INSTRUCTIONS
Your urologist is: Dr.Jennifer Dowell  Office number: [phone]  Address: [address] [place] [address], [address], [facility] [address]      PLEASE READ the following directions and contact our office with any questions via phone or the portal. If you were told that you need an appointment and no appointment was made, call the office the day after surgery and ask to speak with 's nurse to make an appointment.      Findings: (pictures were uploaded into media)  Cysoscopic findings: cystitis througout. Biopsy done to confirm no malignancy but looks like cystitis. Ball valve median lobe. Small prostatic urethra.   TRUS volume: 27.7g  Prostate Ultrasound findings:  · Seminal vesicles/Ejaculatory Ducts: Normal  · Outline/Symmetry of Prostate: WNL  · Central Gland/Transition Zone: Well demarcated  · Peripheral Zone: WNL    Prostate Measurements:   · Height:  23.0 mm  · Width:  36.9 mm  · Length:  62.3 mm  · Volume: 27.7 cm3  · Intravesical protrusion: 18.5  · PSAD: 0.02    Assessment: Eron Turk is a 69 y.o. male with bph/enlarged prostate and urinary retention    Plan:   Continue finasteride until 4 weeks after procedure   Discontinue flomax for now but restart the day before procedure and continue for 4 weeks after procedure   Return on sept 30th for TURP, will stay overnight   preop covid 3 days before   Start antibiotics 7days before, call if none prescribed-will try to do based on culture from today   preop appt 1 week before with cbc, bmp, cxr and ekg   Stop asa 7 days before   F.u culture and treat 7d prior. If negative will write for doxy bid x 7d to start beforehand   F/u bladder biopsy      Transurethral Resection of the Prostate (TURP)     Excess prostate tissue is removed during a TURP to let urine flow freely through the urethra.   TURP is surgery to treat a benign enlargement of the prostate, or BPH (benign prostatic hyperplasia). This surgery removes prostate tissue to  relieve pressure on the urethra. This helps relieve symptoms, such as:  · Urinary obstruction  · Frequent urination  · Decreased urinary stream  TURP is the most common procedure for the treatment of BPH. But certain other procedures also help relieve BPH symptoms. Your health care provider may do one of these instead of TURP. They include TUIP (transurethral incision of the prostate), TUNA (transurethral needle ablation), or laser ablation. If you will have one of these procedures, your healthcare provider can tell you more about it. Your preparation and experience during surgery will be similar to TURP.   Preparing for surgery  Your healthcare provider will tell you how to prepare for your procedure. For instance, you may be asked to stop taking certain medicines a few days before the procedure. You may be asked not to eat or drink anything after the midnight before surgery. Be sure to follow any special instructions youre given.  During the TURP procedure  · You will be given medicine (anesthesia) to keep you from feeling pain during the procedure. It may be given into your spine (epidural). This is not meant to put you to sleep, but it will numb the area where the surgery is being done. In some cases, general anesthesia is used. This is to keep you sleeping throughout the surgery. The anesthesia provider (anesthesiologist or nurse anesthetist) will talk to you about the pain medicine that is best for you.  · The surgeon inserts a cystoscope (a thin, telescope-like device) into your urethra. This device lets him or her see the blocked part of the urethra.  · A cutting device is inserted through the cystoscope. This is used to remove the excess prostate tissue. The cut pieces of tissue collect in the bladder. These pieces are continuously washed away with fluids during the procedure.   · The tissue pieces are sent to the lab to be sure they are free of cancer.   Possible risks and complications of prostate  procedures  · Bleeding  · Infection  · Scarring of the urethra  · Retrograde ejaculation  · Erectile dysfunction (rare)  · Absorption of fluid during the procedure (TURP syndrome)  · Permanent incontinence (very rare)   Retrograde ejaculation  After some surgical treatments, semen may travel into the bladder instead of out of the penis during ejaculation. This side effect is called retrograde ejaculation. As a result, there may be little or no semen when you ejaculate, which can result in infertility. If you are planning to have children, talk to your healthcare provider before having the TURP procedure. Otherwise, this is not harmful to your bladder, and the feeling or orgasm and your erection won't change. Retrograde ejaculation can also be a side effect of certain medicines.  Date Last Reviewed: 1/1/2017 © 2000-2017 [employer]. [address] [place] [address]. All rights reserved. This information is not intended as a substitute for professional medical care. Always follow your healthcare professional's instructions.                Before leaving, please make sure you have all your personal belongings such as glasses, purses, wallets, keys, cell phones, jewelry, jackets etc       Cystoscopy    Cystoscopy is a procedure that lets your doctor look directly inside your urethra and bladder. It can be used to:  · Help diagnose a problem with your urethra, bladder, or kidneys.  · Take a sample (biopsy) of bladder or urethral tissue.  · Treat certain problems (such as removing kidney stones).  · Place a stent to bypass an obstruction.  · Take special X-rays of the kidneys.  Based on the findings, your doctor may recommend other tests or treatments.  What is a cystoscope?  A cystoscope is a telescope-like instrument that contains lenses and fiberoptics (small glass wires that make bright light). The cystoscope may be straight and rigid, or flexible to bend around curves in the urethra. The  doctor may look directly into the cystoscope, or project the image onto a monitor.  Getting ready  · Ask your doctor if you should stop taking any medicines before the procedure.  · Ask whether you should avoid eating or drinking anything after midnight before the procedure.  · Follow any other instructions your doctor gives you.  Tell your doctor before the exam if you:  · Take any medicines, such as aspirin or blood thinners  · Have allergies to any medicines  · Are pregnant   The procedure  Cystoscopy is done in the doctors office, surgery center, or hospital. The doctor and a nurse are present during the procedure. It takes only a few minutes, longer if a biopsy, X-ray, or treatment needs to be done.  During the procedure:  · You lie on an exam table on your back, knees bent and legs apart. You are covered with a drape.  · Your urethra and the area around it are washed. Anesthetic jelly may be applied to numb the urethra. Other pain medicine is usually not needed. In some cases, you may be offered a mild sedative to help you relax. If a more extensive procedure is to be done, such as a biopsy or kidney stone removal, general anesthesia may be needed.  · The cystoscope is inserted. A sterile fluid is put into the bladder to expand it. You may feel pressure from this fluid.  · When the procedure is done, the cystoscope is removed.  After the procedure  If you had a sedative, general anesthesia, or spinal anesthesia, you must have someone drive you home. Once youre home:  · Drink plenty of fluids.  · You may have burning or light bleeding when you urinate--this is normal.  · Medicines may be prescribed to ease any discomfort or prevent infection. Take these as directed.  · Call your doctor if you have heavy bleeding or blood clots, burning that lasts more than a day, a fever over 100°F  (38° C), or trouble urinating.    After Surgery:  Always be aware that any surgery can cause these symptoms:    Pain- Medication  can be prescribed for pain to decrease your pain but may not completely take your pain away.  Over the Counter pain medicine my be enough and you can always use Ice and rest to help ease pain.    Bleeding- a little bleeding after a surgery is usually within normal.  If there is a lot of blood you need to notify your MD.  Emergency treatments of bleeding are cold application, elevation of the bleeding site and compression.    Infection- Infection after surgery is NOT a normal occurrence.  Signs of infection are fever, swelling, hot to touch the incision.  If this occurs notify your MD immediately.    Nausea- this can be common after a surgery especially if you have had anesthesia medicine or are taking pain medicine.  Staying on clear liquids, bland foods, gingerale, or over the counter anti nausea medicines can help.  If you vomit more than once, notify your MD.  Anti Nausea medicines can be prescribed.      Transrectal Ultrasound   A transrectal ultrasound is an imaging test. It uses sound waves to create pictures of a mans prostate gland to determine its size.Your prostate gland is in front of your rectum and if too large may become inflamed and impede the flow of urine. For this test, a special probe (transducer) is placed directly into your rectum.     Before leaving, you may need to wait for a short time while the images are reviewed. In most cases, you can go back to your normal routine after the test.       © 8641-6180 [employer]. [address] [place] [address], [place] [address]. All rights reserved. This information is not intended as a substitute for professional medical care. Always follow your healthcare professional's instructions.

## 2020-09-14 NOTE — OP NOTE
[facility]  Date: 09/14/2020      TRANSRECTAL PROSTATIC ULTRASOUND and Cystoscopy    Procedures: Flexible cystourethroscopy, bladder biopsy and fulguration and Transrectal Ultrasound    Pre Procedure Diagnosis:bph, urinary retention    Post Procedure Diagnosis: same, see below for findings    Surgeon: Joanna Dowell MD    Indications: Eron Turk is a 69 y.o. male with BPH. Current PSA is   PSA, SCREEN (ng/mL)   Date Value   08/12/2020 0.64     . Urine from today reviewed. H&P reviewed. The risks and benefits of both procedures were explained and consent was obtained.     Cytoscopic Specimen: urine sent for culture        TRANSRECTAL ULTRASOUND was performed   The transrectal ultrasound probe was placed in the rectum. Measurements were taken and recorded and the probe was removed.   The patient tolerated the procedures well without complication.    Cystoscopy was performed  2% lidocaine urojet was used for local analgesia.  The genitalia was prepped and draped in the sterile fashion with betadine.    The flexible scope was advanced into the urethra and into the bladder.  Bilateral ureteral orifice were evaluated and noted to be normal with clear efflux.  The bladder was completely surveyed in a systematic fashion and the cytoscope was retroflexed.  Cystoscopy findings as listed below.     A bladder biopsy was performed using cold cup biopsy forceps and then bugbee cautery was used to obtain hemostasis of the biopsied area. The specimen was sent for pathologic analysis. See findings below for more detail      Findings: (pictures were uploaded into media)  Cysoscopic findings: cystitis througout. Biopsy done to confirm no malignancy but looks like cystitis. Ball valve median lobe. Small prostatic urethra.   TRUS volume: 27.7g  Prostate Ultrasound findings:  · Seminal vesicles/Ejaculatory Ducts: Normal  · Outline/Symmetry of Prostate: WNL  · Central Gland/Transition Zone:  Well demarcated  · Peripheral Zone: WNL    Prostate Measurements:   · Height:  23.0 mm  · Width:  36.9 mm  · Length:  62.3 mm  · Volume: 27.7 cm3  · Intravesical protrusion: 18.5  · PSAD: 0.02    Assessment: Eron Turk is a 69 y.o. male with bph/enlarged prostate and urinary retention    Plan:   Continue finasteride until 4 weeks after procedure   Discontinue flomax for now but restart the day before procedure and continue for 4 weeks after procedure   Return on sept 30th for TURP, will stay overnight   preop covid 3 days before   Start antibiotics 7days before, call if none prescribed-will try to do based on culture from today   preop appt 1 week before with cbc, bmp, cxr and ekg   Stop asa 7 days before   F.u culture and treat 7d prior. If negative will write for doxy bid x 7d to start beforehand   F/u bladder biopsy      Joanna Dowell MD

## 2020-09-14 NOTE — DISCHARGE SUMMARY
[facility]  Urology  Discharge Note - Short Stay      Patient Name: Eron Turk  MRN: [id number]  Discharge Date and Time:  09/14/2020 5:13 PM  Attending Physician: Joanna Dowell,*   Discharging Provider: Joanna Dowell MD  Primary Care Physician: Marianne More NP    Final Active Diagnoses:    Diagnosis Date Noted POA    Benign non-nodular prostatic hyperplasia without lower urinary tract symptoms [N40.0] 09/12/2016 Yes      Problems Resolved During this Admission:       Final Diagnoses: Same as principal problem.    Hospital Course: Patient was admitted for an outpatient procedure and tolerated the procedure well with no complications.*    Procedure(s) (LRB):  TRANSRECTAL ULTRASOUND (N/A)  CYSTOSCOPY (N/A)     Indwelling Lines/Drains at time of discharge:   Lines/Drains/Airways     Drain                 Urethral Catheter 08/03/20 1753 Latex 16 Fr. 41 days                Discharged Condition: good    Disposition: home    Follow Up:      Patient Instructions:   No discharge procedures on file.    Medications:  Reconciled Home Medications:      Medication List      CONTINUE taking these medications    amLODIPine 10 MG tablet  Commonly known as: NORVASC  Take 10 mg by mouth once daily. Take 1/2 daily am     aspirin 81 MG EC tablet  Commonly known as: ECOTRIN  Take 81 mg by mouth once daily.     atorvastatin 40 MG tablet  Commonly known as: LIPITOR  Take 40 mg by mouth nightly.     cholecalciferol (vitamin D3) 10 mcg (400 unit) Tab  Commonly known as: VITAMIN D3  Take 400 Units by mouth 2 (two) times daily. Take 2 tabs     finasteride 5 mg tablet  Commonly known as: PROSCAR  Take 1 tablet (5 mg total) by mouth once daily.     fish oil-omega-3 fatty acids 300-1,000 mg capsule  Take 1,000 mg by mouth once daily.     hydroCHLOROthiazide 50 MG tablet  Commonly known as: HYDRODIURIL  Take 50 mg by mouth once daily. Take 1/2 daily     HYDROcodone-acetaminophen 7.5-325 mg per  tablet  Commonly known as: NORCO  Take 1 tablet by mouth every 6 (six) hours as needed for Pain.     lidocaine HCL 2% 2 % jelly  Commonly known as: XYLOCAINE  Apply topically as needed.     metoprolol tartrate 50 MG tablet  Commonly known as: LOPRESSOR  Take 50 mg by mouth 2 (two) times daily.     potassium chloride 10 MEQ Cpsr  Commonly known as: MICRO-K  Take 10 mEq by mouth 2 (two) times daily. 3 tabs  2 x a day     simvastatin 40 MG tablet  Commonly known as: ZOCOR  Take 40 mg by mouth every evening.        STOP taking these medications    nitrofurantoin 100 MG capsule  Commonly known as: MACRODANTIN     tamsulosin 0.4 mg Cap  Commonly known as: FLOMAX            Discharge Procedure Orders (must include Diet, Follow-up, Activity):  No discharge procedures on file.         Joanna Dowell MD  Urology  [facility]

## 2020-09-14 NOTE — H&P
[facility] H&P Progress Note - [place]  [facility]    Chief Complaint: No chief complaint on file.      Subjective:        HPI: Eron Turk is a 69 y.o. male presents with     Interval history 10/14/16:     He recently moved from [place]. He found a testicular lump and underwent a scrotal US at [facility]. He was told he had a cyst in his scroum and on last visit he was found to have an epididymal cyst.      He also c/o occ weak stream. occ hesisistancy. He has frequency q1-2 hours. Nocturia 5x a night. He is on hytrin which he's been on for the past 10 years. occ straining. He has occ UUI. He does not feel like he empties his bladder. i started ditropan at last visit and he states that he is happy with his symptoms. Feels like he is able to hold his urine, denies any leakage. Nocturia decreased to 2-3x a night. Frequency q3-4 hours.     I scheduled him for cysto/trus on 9/12/16. Volume 33.7g and he was found to have an enlarged median lobe. Mod bilobar hypertrophy.     Uroflow results : Mean flow: 4mL/s. Voided volume: 232 mL. PVR 174cc     Plan: pt not interested in suregery. cont ditropan, continue terazosin. F/u in 6 months.     Interval history 8/4/20:     He went to ER with 2d history of urinary retention associated with full bladder and pain with urge. Has been on terazosin and ditropan 10mg XL. He was supposed to f/u in 2016 but they didn't know. He's been having increasing urgency and frequency with UUI requiring depends. Nocturia 4-5x. Wearing 3 depends a day.    Plan: felt retention due to bph, constipation and oab med. rec'd fiber gummies, d/c ditropan, ,d/c terazosin, sart flomax 0.8mg and return for voiding trial. psa (8/12-0.64)    Returned on 8/6 for voiding trial. He had urge to void but could not and pvr by scan was 287. Catheter placed and 300cc drained.     Interval history 8/20/20:   Has been having urge to void    Plan: repeat voiding trial. Cysto  trus. cx had grown e.faecalis, had him d/c macrobid and restart 5d prior to cysto trus    Interval history 20:   Repeat voiding trial on 20. pvr by I&O: 700c. Significant urge to void at that time    Here today for cysto trus    psa history - no family hx of prostate cancer  20 0.64  8/3/20  Cath residual: 1500cc., 20: difficult to palpate prostate  10/14/16 UF: avg flow 4mL/s, voided vol: 232, pvr: 174cc  16 Cysto/trus: 33.7g   16 0.87    Urine history:  20 250 bld/prt+/wbc+  8/10/20 E.faecalis, cath: 3+bld/tr nba, 18 wbc/>100 rbc/mod bact  8/3/20  Cath: neg  10/4/16 Neg  16 Neg  16 neg       Past Medical History:   Diagnosis Date    Arthritis     BPH (benign prostatic hypertrophy)     Encounter for blood transfusion     Hypertension     PTSD (post-traumatic stress disorder)     PUD (peptic ulcer disease)      Past Surgical History:   Procedure Laterality Date    ANKLE SURGERY Left     HERNIA REPAIR      JOINT REPLACEMENT      right    stomach ulcer surgery         Cardiology: none  Colonoscopy:      Fam Hx:   malignancies: No  . Mother  a 86. Father  a 90  kidney stones: Yes - little brother      Soc Hx:  No tobacco.  5 to 6 beers per day - alcohol  [place] in [place]  :unmarried but with partner Brielle  Children: 3  Occupation: and      Allergies:  Review of patient's allergies indicates no known allergies.     Medications: reviewed   Anticoagulation: Yes - asa 81mg daily    Urologic meds: ditropan, hytrin 10mg  Anticoagulation: Yes - asa 81mg    Objective:     Vitals:    20 1630   BP: (!) 154/85   Pulse: 67   Resp:    Temp:        Physical Exam:  General:WDWN in NAD  Neurologic: CN grossly normal. Normal sensation.   Psychiatric: awake, alert and oriented x 3. Mood and affect normal. Cooperative.  Eyes: PERRLA, normal conjunctiva  Respiratory: no increased work on breathing. No wheezing.    Cardiovascular: No obvious extremity edema. Warm and well perfused.  GI: no obvious stomach distension  Musculoskeletal: normal range of motion of bilateral upper extremities. Normal muscle strength and tone.  Skin: no obvious rashes or lesions. No tightening of skin noted.          Labs:  Recent Labs   Lab 08/03/20  1837   WBC 10.51   Hemoglobin 13.6 L   Hematocrit 40.8   Platelets 159   ]  Recent Labs   Lab 08/03/20  1837   Sodium 141   Potassium 4.5   Chloride 106   CO2 24   BUN, Bld 13   Creatinine 0.9   Glucose 114 H   Calcium 8.9   ]    Lab Results   Component Value Date    HGBA1C 5.6 02/12/2016       Path: See hpi for recent   none    Rads: See hpi for recent   none    Assessment:       1. Benign non-nodular prostatic hyperplasia without lower urinary tract symptoms    2. Urinary retention        Plan:     Here today for cysto trus for turp planning in pt with failed vodiing trial x 3    Has been on macrobid  Will plan to reinsert catheter    This patient has been cleared for surgery in ambulatory surgical facility.

## 2020-09-16 ENCOUNTER — TELEPHONE (OUTPATIENT)
Dept: UROLOGY | Facility: CLINIC | Age: 70
End: 2020-09-16

## 2020-09-16 NOTE — TELEPHONE ENCOUNTER
Spoke with patient and his wife informed them of recommendations. All appointments scheduled. Verbally voiced understanding.

## 2020-09-16 NOTE — TELEPHONE ENCOUNTER
----- Message from Joanna Dowell MD sent at 9/14/2020  5:16 PM CDT -----    Findings: (pictures were uploaded into media)  Cysoscopic findings: cystitis througout. Biopsy done to confirm no malignancy but looks like cystitis. Ball valve median lobe. Small prostatic urethra.   TRUS volume: 27.7g  Prostate Ultrasound findings:  Seminal vesicles/Ejaculatory Ducts: Normal  Outline/Symmetry of Prostate: WNL  Central Gland/Transition Zone: Well demarcated  Peripheral Zone: WNL    Prostate Measurements:   Height:  23.0 mm  Width:  36.9 mm  Length:  62.3 mm  Volume: 27.7 cm3  Intravesical protrusion: 18.5  PSAD: 0.02    Assessment: Eron Turk is a 69 y.o. male with bph/enlarged prostate and urinary retention    Plan:  Continue finasteride until 4 weeks after procedure  Discontinue flomax for now but restart the day before procedure and continue for 4 weeks after procedure  Return on sept 30th for TURP, will stay overnight  preop covid 3 days before  Start antibiotics 7days before, call if none prescribed-will try to do based on culture from today  preop appt 1 week before with cbc, bmp, cxr and ekg  Stop asa 7 days before  F.u culture and treat 7d prior. If negative will write for doxy bid x 7d to start beforehand  F/u bladder biopsy

## 2020-09-17 LAB
BACTERIA UR CULT: ABNORMAL
FINAL PATHOLOGIC DIAGNOSIS: NORMAL
GROSS: NORMAL

## 2020-09-21 ENCOUNTER — TELEPHONE (OUTPATIENT)
Dept: UROLOGY | Facility: CLINIC | Age: 70
End: 2020-09-21

## 2020-09-21 RX ORDER — AMOXICILLIN AND CLAVULANATE POTASSIUM 875; 125 MG/1; MG/1
1 TABLET, FILM COATED ORAL 2 TIMES DAILY
Qty: 20 TABLET | Refills: 0 | Status: SHIPPED | OUTPATIENT
Start: 2020-09-24 | End: 2020-10-04

## 2020-09-21 NOTE — TELEPHONE ENCOUNTER
Remind pt to stop asa this wed  He needs to start augmentin twice a day starting Thursday - for 10 days  Culture grew e.faecalis sensitive to ampicillin  Remind him about the preop covid  Restart flomax on Tuesday

## 2020-09-23 ENCOUNTER — TELEPHONE (OUTPATIENT)
Dept: UROLOGY | Facility: CLINIC | Age: 70
End: 2020-09-23

## 2020-09-23 ENCOUNTER — CLINICAL SUPPORT (OUTPATIENT)
Dept: UROLOGY | Facility: CLINIC | Age: 70
End: 2020-09-23
Payer: OTHER GOVERNMENT

## 2020-09-23 ENCOUNTER — HOSPITAL ENCOUNTER (OUTPATIENT)
Dept: PREADMISSION TESTING | Facility: HOSPITAL | Age: 70
Discharge: HOME | End: 2020-09-23
Attending: UROLOGY
Payer: OTHER GOVERNMENT

## 2020-09-23 ENCOUNTER — HOSPITAL ENCOUNTER (OUTPATIENT)
Dept: RADIOLOGY | Facility: HOSPITAL | Age: 70
Discharge: HOME | End: 2020-09-23
Attending: UROLOGY
Payer: OTHER GOVERNMENT

## 2020-09-23 VITALS — HEIGHT: 69 IN | BODY MASS INDEX: 37.33 KG/M2 | WEIGHT: 252 LBS

## 2020-09-23 DIAGNOSIS — Z01.818 PREOP TESTING: Primary | ICD-10-CM

## 2020-09-23 DIAGNOSIS — D72.829 LEUKOCYTOSIS, UNSPECIFIED TYPE: Primary | ICD-10-CM

## 2020-09-23 DIAGNOSIS — R33.9 URINARY RETENTION: ICD-10-CM

## 2020-09-23 DIAGNOSIS — R82.998 CELLS AND CASTS IN URINE: Primary | ICD-10-CM

## 2020-09-23 LAB
BACTERIA #/AREA URNS HPF: ABNORMAL /HPF
BILIRUB UR QL STRIP: NEGATIVE
CLARITY UR: CLEAR
COLOR UR: YELLOW
GLUCOSE UR QL STRIP: NEGATIVE
HGB UR QL STRIP: NEGATIVE
KETONES UR QL STRIP: NEGATIVE
LEUKOCYTE ESTERASE UR QL STRIP: ABNORMAL
MICROSCOPIC COMMENT: ABNORMAL
NITRITE UR QL STRIP: NEGATIVE
PH UR STRIP: 6 [PH] (ref 5–8)
PROT UR QL STRIP: NEGATIVE
SP GR UR STRIP: 1.01 (ref 1–1.03)
SQUAMOUS #/AREA URNS HPF: 1 /HPF
URN SPEC COLLECT METH UR: ABNORMAL
UROBILINOGEN UR STRIP-ACNC: NEGATIVE EU/DL
WBC #/AREA URNS HPF: 6 /HPF (ref 0–5)

## 2020-09-23 PROCEDURE — 99499 NO LOS: ICD-10-PCS | Mod: S$PBB,,, | Performed by: UROLOGY

## 2020-09-23 PROCEDURE — 99499 UNLISTED E&M SERVICE: CPT | Mod: S$PBB,,, | Performed by: UROLOGY

## 2020-09-23 PROCEDURE — 93005 ELECTROCARDIOGRAM TRACING: CPT

## 2020-09-23 PROCEDURE — 96372 THER/PROPH/DIAG INJ SC/IM: CPT | Mod: PBBFAC,PN

## 2020-09-23 PROCEDURE — 71046 X-RAY EXAM CHEST 2 VIEWS: CPT | Mod: TC,FY

## 2020-09-23 PROCEDURE — 93010 ELECTROCARDIOGRAM REPORT: CPT | Mod: ,,, | Performed by: INTERNAL MEDICINE

## 2020-09-23 PROCEDURE — 71046 XR CHEST PA AND LATERAL: ICD-10-PCS | Mod: 26,,, | Performed by: RADIOLOGY

## 2020-09-23 PROCEDURE — 71046 X-RAY EXAM CHEST 2 VIEWS: CPT | Mod: 26,,, | Performed by: RADIOLOGY

## 2020-09-23 PROCEDURE — 93010 EKG 12-LEAD: ICD-10-PCS | Mod: ,,, | Performed by: INTERNAL MEDICINE

## 2020-09-23 PROCEDURE — 87086 URINE CULTURE/COLONY COUNT: CPT

## 2020-09-23 PROCEDURE — 99900103 DSU ONLY-NO CHARGE-INITIAL HR (STAT)

## 2020-09-23 PROCEDURE — 99900104 DSU ONLY-NO CHARGE-EA ADD'L HR (STAT)

## 2020-09-23 PROCEDURE — 81000 URINALYSIS NONAUTO W/SCOPE: CPT

## 2020-09-23 RX ORDER — LISINOPRIL 40 MG/1
TABLET ORAL
COMMUNITY
Start: 2020-03-04

## 2020-09-23 RX ORDER — GENTAMICIN SULFATE 40 MG/ML
80 INJECTION, SOLUTION INTRAMUSCULAR; INTRAVENOUS ONCE
Status: COMPLETED | OUTPATIENT
Start: 2020-09-23 | End: 2020-09-23

## 2020-09-23 RX ORDER — CALCIUM CARBONATE 300MG(750)
TABLET,CHEWABLE ORAL
COMMUNITY
Start: 2020-03-04

## 2020-09-23 RX ORDER — GENTAMICIN SULFATE 80 MG/100ML
80 INJECTION, SOLUTION INTRAVENOUS
Status: DISCONTINUED | OUTPATIENT
Start: 2020-09-23 | End: 2020-10-01

## 2020-09-23 RX ADMIN — GENTAMICIN SULFATE 80 MG: 40 INJECTION, SOLUTION INTRAMUSCULAR; INTRAVENOUS at 02:09

## 2020-09-23 NOTE — PROGRESS NOTES
Per  patient arrived in clinic for a sharma catheter change and gentamicin injection.  Patient received Gentamicin 80mg IM in right gluteal. Patient tolerated well no adverse reaction noted.    Patient draped and prepped in sterile fashion.   Catheter clamped for 10 minutes  Old 16F coude catheter removed without difficulty. 10cc of sterile water removed from balloon.  New 16Fr coude catheter placed into the bladder with no difficulty.    Urine collected from new catheter and sent for urinalysis, urine culture and microscopic urinalysis   Balloon filled with 10ml of sterile water  Catheter attached to leg bag.  Leg bag secured to right leg with stat-lock.       Patient left the office in satisfactory condition

## 2020-09-23 NOTE — TELEPHONE ENCOUNTER
Pt's wbc is elevated at 19.41.  Please see if he is taking antibiotics, which one, and how much longer *was suppposed to start augmentin tomorrow but can start today)    If he has a fever he will need to come in for admit  If not, Please clamp the sharma for 10 minutes    Then remove the catheter  And place a new catehter and send the urien from the new sharma catehter (not bag) for a urinalysis, urinalysis microscopic and a culture  He should have gentamicin 80mg IM x 1 now   Then will start him on augmentin    Repeat a cbc on Friday morning. If still elevated should come in for admit.   If coming down then repeat morning of surgery     Should also have a cxr

## 2020-09-23 NOTE — DISCHARGE INSTRUCTIONS
To confirm, Your doctor has instructed you that surgery is scheduled for: 9/30/2020    Please report to [facility], Registration the morning of surgery. You must check-in and receive a wristband before going to your procedure.    Pre-Op will call the afternoon prior to surgery between 1:00 and 6:00 PM with the final arrival time.  Phone number: [phone]    PLEASE NOTE:  The surgery schedule has many variables which may affect the time of your surgery case.  Family members should be available if your surgery time changes.  Plan to be here the day of your procedure between 4-6 hours.    MEDICATIONS:  TAKE ONLY THESE MEDICATIONS WITH A SMALL SIP OF WATER THE MORNING OF YOUR PROCEDURE:  METOPROLOL, HYDROCODONE IF NEEDED, FINASTERIDE, AMLODIPINE    NO LISINOPRIL OR HYDROCHLOROTHIZIDE MORNING OF SURGERY BUT DO NOT STOP DAYS BEFORE    DO NOT TAKE THESE MEDICATIONS 5-7 DAYS PRIOR to your procedure or per your surgeon's request: ASPIRIN, ALEVE, ADVIL, IBUPROFEN, FISH OIL VITAMIN E, HERBALS  (May take Tylenol)    ONLY if you are prescribed any types of blood thinners such as:  Aspirin, Coumadin, Plavix, Pradaxa, Xarelto, Aggrenox, Effient, Eliquis, Savasya, Brilinta, or any other, ask your surgeon whether you should stop taking them and how long before surgery you should stop.  You may also need to verify with the prescribing physician if it is ok to stop your medication.      INSTRUCTIONS IMPORTANT!!  · Do not eat or drink anything between midnight and the time of your procedure- this includes gum, mints, and candy.  · Do not smoke or drink alcoholic beverages 24 hours prior to your procedure.  · Shower the night before AND the morning of your procedure with a Chlorhexidine wash such as Hibiclens or Dial antibacterial soap from the neck down.  Do not get it on your face or in your eyes.  You may use your own shampoo and face wash. This helps your skin to be as bacteria free as possible.    · If you  wear contact lenses, dentures, hearing aids or glasses, bring a container to put them in during surgery and give to a family member for safe keeping.  Please leave all jewelry, piercing's and valuables at home.   · DO NOT remove hair from the surgery site.  Do not shave the incision site unless you are given specific instructions to do so.    · ONLY if you have been diagnosed with sleep apnea please bring your C-PAP machine.  · ONLY if you wear home oxygen please bring your portable oxygen tank the day of your procedure.  · ONLY if you have a history of OPEN HEART SURGERY you will need a clearance from your Cardiologist per Anesthesia.      · ONLY for patients requiring bowel prep, written instructions will be given by your doctor's office.  · ONLY if you have a neuro stimulator, please bring the controller with you the morning of surgery  · ONLY if a type and screen test is needed before surgery, please return: n/a  · If your doctor has scheduled you for an overnight stay, bring a small overnight bag with any personal items you need.  · Make arrangements in advance for transportation home by a responsible adult.  It is not safe to drive a vehicle during the 24 hours after anesthesia.     · ONLY ONE VISITOR PER PATIENT IS ALLOWED TO COME IN THE HOSPITAL THE DAY OF PROCEDURE.   · Visiting hours are 8:00AM-6:00PM. For the safety of all patients, visitors under the age of 12 are not allowed above the first floor of the hospital.    · All [facility] facilities and properties are tobacco free.  Smoking is NOT allowed.       If you have any questions about these instructions, call Pre-Op Admit  Nursing at [phone] or the Pre-Op Day Surgery Unit at [phone].

## 2020-09-25 ENCOUNTER — HOSPITAL ENCOUNTER (OUTPATIENT)
Dept: PREADMISSION TESTING | Facility: HOSPITAL | Age: 70
Discharge: HOME | End: 2020-09-25
Attending: UROLOGY
Payer: COMMERCIAL

## 2020-09-25 DIAGNOSIS — Z01.818 PREOP TESTING: ICD-10-CM

## 2020-09-25 LAB
BACTERIA UR CULT: NO GROWTH
BASOPHILS # BLD AUTO: 0.09 K/UL (ref 0–0.2)
BASOPHILS NFR BLD: 0.8 % (ref 0–1.9)
DIFFERENTIAL METHOD: ABNORMAL
EOSINOPHIL # BLD AUTO: 0.1 K/UL (ref 0–0.5)
EOSINOPHIL NFR BLD: 1.1 % (ref 0–8)
ERYTHROCYTE [DISTWIDTH] IN BLOOD BY AUTOMATED COUNT: 12.5 % (ref 11.5–14.5)
HCT VFR BLD AUTO: 41.7 % (ref 40–54)
HGB BLD-MCNC: 13.8 G/DL (ref 14–18)
IMM GRANULOCYTES # BLD AUTO: 0.17 K/UL (ref 0–0.04)
IMM GRANULOCYTES NFR BLD AUTO: 1.5 % (ref 0–0.5)
LYMPHOCYTES # BLD AUTO: 3.2 K/UL (ref 1–4.8)
LYMPHOCYTES NFR BLD: 28.9 % (ref 18–48)
MCH RBC QN AUTO: 30.9 PG (ref 27–31)
MCHC RBC AUTO-ENTMCNC: 33.1 G/DL (ref 32–36)
MCV RBC AUTO: 94 FL (ref 82–98)
MONOCYTES # BLD AUTO: 1 K/UL (ref 0.3–1)
MONOCYTES NFR BLD: 8.9 % (ref 4–15)
NEUTROPHILS # BLD AUTO: 6.5 K/UL (ref 1.8–7.7)
NEUTROPHILS NFR BLD: 58.8 % (ref 38–73)
NRBC BLD-RTO: 0 /100 WBC
PLATELET # BLD AUTO: 182 K/UL (ref 150–350)
PMV BLD AUTO: 10 FL (ref 9.2–12.9)
RBC # BLD AUTO: 4.46 M/UL (ref 4.6–6.2)
WBC # BLD AUTO: 11 K/UL (ref 3.9–12.7)

## 2020-09-25 PROCEDURE — 99900103 DSU ONLY-NO CHARGE-INITIAL HR (STAT)

## 2020-09-25 PROCEDURE — 36415 COLL VENOUS BLD VENIPUNCTURE: CPT

## 2020-09-25 PROCEDURE — 85025 COMPLETE CBC W/AUTO DIFF WBC: CPT

## 2020-09-27 ENCOUNTER — LAB VISIT (OUTPATIENT)
Dept: PRIMARY CARE CLINIC | Facility: CLINIC | Age: 70
End: 2020-09-27
Payer: COMMERCIAL

## 2020-09-27 DIAGNOSIS — Z01.818 PREOP EXAMINATION: ICD-10-CM

## 2020-09-27 LAB — SARS-COV-2 RNA RESP QL NAA+PROBE: NOT DETECTED

## 2020-09-27 PROCEDURE — U0003 INFECTIOUS AGENT DETECTION BY NUCLEIC ACID (DNA OR RNA); SEVERE ACUTE RESPIRATORY SYNDROME CORONAVIRUS 2 (SARS-COV-2) (CORONAVIRUS DISEASE [COVID-19]), AMPLIFIED PROBE TECHNIQUE, MAKING USE OF HIGH THROUGHPUT TECHNOLOGIES AS DESCRIBED BY CMS-2020-01-R: HCPCS

## 2020-09-29 ENCOUNTER — ANESTHESIA EVENT (OUTPATIENT)
Dept: SURGERY | Facility: HOSPITAL | Age: 70
End: 2020-09-29
Payer: OTHER GOVERNMENT

## 2020-09-29 NOTE — H&P
[facility] H&P Progress Note - [place]  [facility]    Chief Complaint: No chief complaint on file.      Subjective:        HPI: Eron Turk is a 69 y.o. male presents with     Interval history 10/14/16:     He recently moved from [place]. He found a testicular lump and underwent a scrotal US at [facility]. He was told he had a cyst in his scroum and on last visit he was found to have an epididymal cyst.      He also c/o occ weak stream. occ hesisistancy. He has frequency q1-2 hours. Nocturia 5x a night. He is on hytrin which he's been on for the past 10 years. occ straining. He has occ UUI. He does not feel like he empties his bladder. i started ditropan at last visit and he states that he is happy with his symptoms. Feels like he is able to hold his urine, denies any leakage. Nocturia decreased to 2-3x a night. Frequency q3-4 hours.     I scheduled him for cysto/trus on 9/12/16. Volume 33.7g and he was found to have an enlarged median lobe. Mod bilobar hypertrophy.     Uroflow results : Mean flow: 4mL/s. Voided volume: 232 mL. PVR 174cc     Plan: pt not interested in suregery. cont ditropan, continue terazosin. F/u in 6 months.     Interval history 8/4/20:     He went to ER with 2d history of urinary retention associated with full bladder and pain with urge. Has been on terazosin and ditropan 10mg XL. He was supposed to f/u in 2016 but they didn't know. He's been having increasing urgency and frequency with UUI requiring depends. Nocturia 4-5x. Wearing 3 depends a day.    Plan: felt retention due to bph, constipation and oab med. rec'd fiber gummies, d/c ditropan, ,d/c terazosin, sart flomax 0.8mg and return for voiding trial. psa (8/12-0.64)    Returned on 8/6 for voiding trial. He had urge to void but could not and pvr by scan was 287. Catheter placed and 300cc drained.     Interval history 8/20/20:   Has been having urge to void     Plan: repeat voiding trial.  Cysto trus. cx had grown e.faecalis, had him d/c macrobid and restart 5d prior to cysto trus     Interval history 20:   Repeat voiding trial on 20. pvr by I&O: 700c. Significant urge to void at that time    Cysto trus 20- cystitis. Ball valve median lobe. trus volume 27.7g. biopsy of abnormal tissue. Path returned granulation tissue c/w cystitis.     Plan: cont flomax and finasteride until 4 weeks after procedure. Start abx 7d prior. Schedule turp.    On 20 preop his wbc was found to be 19.41. had him start augmentin, exchange catheter and obtain culture from new catheter. cx was NG. Repeat wbc came down to 11.     Interval history 20:   Here today for turp    psa history - no family hx of prostate cancer  20 Cysto trus: 27.7g, ball valve median lobe  20 pvr by io: 700cc  20 0.64  8/3/20  Cath residual: 1500cc., 20: difficult to palpate prostate  10/14/16 UF: avg flow 4mL/s, voided vol: 232, pvr: 174cc  16 Cysto/trus: 33.7g   16 0.87    Urine history:  20 ng  20            e.faecalis, 250 bld/prt+/wbc+  8/10/20            E.faecalis, cath: 3+bld/tr nba, 18 wbc/>100 rbc/mod bact  8/3/20              Cath: neg  10/4/16            Neg  16            Neg  16            neg          Past Medical History:   Diagnosis Date    Arthritis     BPH (benign prostatic hypertrophy)     Encounter for blood transfusion     Hypertension     PTSD (post-traumatic stress disorder)     PUD (peptic ulcer disease)      Past Surgical History:   Procedure Laterality Date    ANKLE SURGERY Left     HERNIA REPAIR      JOINT REPLACEMENT      right    stomach ulcer surgery      TRANSRECTAL ULTRASOUND EXAMINATION N/A 2020    Procedure: TRANSRECTAL ULTRASOUND;  Surgeon: Joanna Dowell MD;  [facility];  Service: Urology;  Laterality: N/A;       Cardiology: none  Colonoscopy:      Fam Hx:   malignancies: No  . Mother  a .  Father  a 90  kidney stones: Yes - little brother      Soc Hx:  No tobacco.  5 to 6 beers per day - alcohol  [place] in [place]  :unmarried but with partner Brielle  Children: 3  Occupation: and      Allergies:  Review of patient's allergies indicates no known allergies.     Medications: reviewed   Anticoagulation: Yes - asa 81mg daily    Urologic meds: ditropan, hytrin 10mg  Anticoagulation: Yes - asa 81mg    Objective:     Vitals:    20 0842   BP: (!) 156/70   Pulse: 62   Resp: 18   Temp: 98.1 °F (36.7 °C)       Physical Exam:  General:WDWN in NAD  Neurologic: CN grossly normal. Normal sensation.   Psychiatric: awake, alert and oriented x 3. Mood and affect normal. Cooperative.  Eyes: PERRLA, normal conjunctiva  Respiratory: no increased work on breathing. No wheezing.   Cardiovascular: No obvious extremity edema. Warm and well perfused.  GI: no obvious stomach distension  Musculoskeletal: normal range of motion of bilateral upper extremities. Normal muscle strength and tone.  Skin: no obvious rashes or lesions. No tightening of skin noted.        Labs:  Recent Labs   Lab 20  1837 20  1052 20  0834   WBC 10.51 19.41 H 11.00   Hemoglobin 13.6 L 14.2 13.8 L   Hematocrit 40.8 41.2 41.7   Platelets 159 205 182   ]  Recent Labs   Lab 20  1837 20  1052   Sodium 141 140   Potassium 4.5 3.8   Chloride 106 104   CO2 24 27   BUN, Bld 13 13   Creatinine 0.9 0.8   Glucose 114 H 120 H   Calcium 8.9 8.7   Alkaline Phosphatase  --  57   Total Protein  --  7.0   Albumin  --  3.7   Total Bilirubin  --  0.4   AST  --  14   ALT  --  24   ]    Lab Results   Component Value Date    HGBA1C 5.6 2016       Path: See hpi for recent   none    Rads: See hpi for recent   none    Assessment:       No diagnosis found.      Plan:     To or today for traditional turp  Has urge to void, median lobe and failed 2+ voiding trials  Potential he could still have  elevated residulas but with urge to void suspect bph with obstruction from median lobe primary issue    Has been on augmentin but at risk for sepsis   Plan for observation overnight   [place] to remain for 5-7 days after procedure. Dc flomax and finasteride 1 month after procedure  Has been off asa    The patient is scheduled for transurethral resection of a prostate. The risks and benefits of resection of TURP were discussed with the patient in detail.     We discussed different treatment options for symptomatic BPH and because of his median lobe and retention we decided on TURP.    Consent was obtained.  The risks include but are not limited to urethral stricture, bladder neck contracture, incomplete emptying or retention requiring a catheter, urethral meatus stricture, burning with urination, overactive bladder, bleeding, infection, pain, need for further procedures, injury to the kidney, ureter, bladder, bladder perforation, prostate capsular penetration, need for open surgery, need for continued use of prostate medications and chance of finding prostate cancer in the specimen chips.  The patient was informed that they may require a ureteral stent and that stents can cause irritative voiding symptoms.  They also understand that ureteral stents are temporary and must be removed or exchanged in a timely fashion or they can calcify and make more stones and become difficult to remove. The patient understands they may will need a catheter post op and observation in the hospital overnight on continuous bladder irrigation.     Patient understands these risks and has agreed to proceed with surgery.

## 2020-09-30 ENCOUNTER — HOSPITAL ENCOUNTER (OUTPATIENT)
Facility: HOSPITAL | Age: 70
Discharge: HOME | End: 2020-10-01
Attending: UROLOGY | Admitting: INTERNAL MEDICINE
Payer: OTHER GOVERNMENT

## 2020-09-30 ENCOUNTER — ANESTHESIA (OUTPATIENT)
Dept: SURGERY | Facility: HOSPITAL | Age: 70
End: 2020-09-30
Payer: OTHER GOVERNMENT

## 2020-09-30 ENCOUNTER — TELEPHONE (OUTPATIENT)
Dept: UROLOGY | Facility: CLINIC | Age: 70
End: 2020-09-30

## 2020-09-30 DIAGNOSIS — N40.0 BENIGN NON-NODULAR PROSTATIC HYPERPLASIA WITHOUT LOWER URINARY TRACT SYMPTOMS: Primary | ICD-10-CM

## 2020-09-30 DIAGNOSIS — R33.9 URINARY RETENTION: ICD-10-CM

## 2020-09-30 DIAGNOSIS — N40.0 BPH (BENIGN PROSTATIC HYPERPLASIA): ICD-10-CM

## 2020-09-30 PROBLEM — I10 ESSENTIAL HYPERTENSION: Status: ACTIVE | Noted: 2020-09-30

## 2020-09-30 PROCEDURE — 25000003 PHARM REV CODE 250: Performed by: UROLOGY

## 2020-09-30 PROCEDURE — 63600175 PHARM REV CODE 636 W HCPCS: Performed by: UROLOGY

## 2020-09-30 PROCEDURE — 25000003 PHARM REV CODE 250: Performed by: NURSE PRACTITIONER

## 2020-09-30 PROCEDURE — C1769 GUIDE WIRE: HCPCS | Performed by: UROLOGY

## 2020-09-30 PROCEDURE — 00914 ANES TRURL PX RESCJ PRST8: CPT | Performed by: UROLOGY

## 2020-09-30 PROCEDURE — 37000008 HC ANESTHESIA 1ST 15 MINUTES: Performed by: UROLOGY

## 2020-09-30 PROCEDURE — 27201423 OPTIME MED/SURG SUP & DEVICES STERILE SUPPLY: Performed by: UROLOGY

## 2020-09-30 PROCEDURE — 88305 TISSUE EXAM BY PATHOLOGIST: ICD-10-PCS | Mod: 26,,, | Performed by: PATHOLOGY

## 2020-09-30 PROCEDURE — D9220A PRA ANESTHESIA: Mod: ,,, | Performed by: ANESTHESIOLOGY

## 2020-09-30 PROCEDURE — 25000003 PHARM REV CODE 250: Performed by: NURSE ANESTHETIST, CERTIFIED REGISTERED

## 2020-09-30 PROCEDURE — 52601 PR TRANSURETHRAL ELEC-SURG PROSTATECTOM: ICD-10-PCS | Mod: ,,, | Performed by: UROLOGY

## 2020-09-30 PROCEDURE — 99900104 DSU ONLY-NO CHARGE-EA ADD'L HR (STAT): Performed by: UROLOGY

## 2020-09-30 PROCEDURE — 37000009 HC ANESTHESIA EA ADD 15 MINS: Performed by: UROLOGY

## 2020-09-30 PROCEDURE — 009914 HC ANESTHESIA 1ST 15 MINUTES: Performed by: UROLOGY

## 2020-09-30 PROCEDURE — 52601 PROSTATECTOMY (TURP): CPT | Mod: ,,, | Performed by: UROLOGY

## 2020-09-30 PROCEDURE — 63600175 PHARM REV CODE 636 W HCPCS: Performed by: NURSE ANESTHETIST, CERTIFIED REGISTERED

## 2020-09-30 PROCEDURE — 71000033 HC RECOVERY, INTIAL HOUR: Performed by: UROLOGY

## 2020-09-30 PROCEDURE — 36000707: Performed by: UROLOGY

## 2020-09-30 PROCEDURE — 88305 TISSUE EXAM BY PATHOLOGIST: CPT | Mod: 26,,, | Performed by: PATHOLOGY

## 2020-09-30 PROCEDURE — 009914 HC ANESTHESIA EA ADD 15 MINS: Performed by: UROLOGY

## 2020-09-30 PROCEDURE — 25000003 PHARM REV CODE 250: Performed by: ANESTHESIOLOGY

## 2020-09-30 PROCEDURE — D9220A PRA ANESTHESIA: ICD-10-PCS | Mod: ,,, | Performed by: ANESTHESIOLOGY

## 2020-09-30 PROCEDURE — 71000039 HC RECOVERY, EACH ADD'L HOUR: Performed by: UROLOGY

## 2020-09-30 PROCEDURE — 88305 TISSUE EXAM BY PATHOLOGIST: CPT | Performed by: PATHOLOGY

## 2020-09-30 PROCEDURE — 63600175 PHARM REV CODE 636 W HCPCS: Performed by: ANESTHESIOLOGY

## 2020-09-30 PROCEDURE — 99900103 DSU ONLY-NO CHARGE-INITIAL HR (STAT): Performed by: UROLOGY

## 2020-09-30 PROCEDURE — 36000706: Performed by: UROLOGY

## 2020-09-30 RX ORDER — FENTANYL CITRATE 50 UG/ML
INJECTION, SOLUTION INTRAMUSCULAR; INTRAVENOUS
Status: DISCONTINUED | OUTPATIENT
Start: 2020-09-30 | End: 2020-09-30

## 2020-09-30 RX ORDER — SIMVASTATIN 40 MG/1
40 TABLET, FILM COATED ORAL NIGHTLY
Status: DISCONTINUED | OUTPATIENT
Start: 2020-09-30 | End: 2020-10-01 | Stop reason: HOSPADM

## 2020-09-30 RX ORDER — ACETAMINOPHEN 10 MG/ML
INJECTION, SOLUTION INTRAVENOUS
Status: DISCONTINUED | OUTPATIENT
Start: 2020-09-30 | End: 2020-09-30

## 2020-09-30 RX ORDER — ONDANSETRON HYDROCHLORIDE 2 MG/ML
INJECTION, SOLUTION INTRAMUSCULAR; INTRAVENOUS
Status: DISCONTINUED | OUTPATIENT
Start: 2020-09-30 | End: 2020-09-30

## 2020-09-30 RX ORDER — PROPOFOL 10 MG/ML
VIAL (ML) INTRAVENOUS
Status: DISCONTINUED | OUTPATIENT
Start: 2020-09-30 | End: 2020-09-30

## 2020-09-30 RX ORDER — CIPROFLOXACIN 500 MG/1
500 TABLET ORAL 2 TIMES DAILY
Qty: 6 TABLET | Refills: 0 | Status: SHIPPED | OUTPATIENT
Start: 2020-09-30 | End: 2020-10-03

## 2020-09-30 RX ORDER — AMLODIPINE BESYLATE 5 MG/1
10 TABLET ORAL DAILY
Status: DISCONTINUED | OUTPATIENT
Start: 2020-10-01 | End: 2020-10-01 | Stop reason: HOSPADM

## 2020-09-30 RX ORDER — FENTANYL CITRATE 50 UG/ML
25 INJECTION, SOLUTION INTRAMUSCULAR; INTRAVENOUS EVERY 5 MIN PRN
Status: COMPLETED | OUTPATIENT
Start: 2020-09-30 | End: 2020-09-30

## 2020-09-30 RX ORDER — HYDROCODONE BITARTRATE AND ACETAMINOPHEN 5; 325 MG/1; MG/1
1 TABLET ORAL EVERY 6 HOURS PRN
Status: DISCONTINUED | OUTPATIENT
Start: 2020-09-30 | End: 2020-10-01 | Stop reason: HOSPADM

## 2020-09-30 RX ORDER — ATROPA BELLADONNA AND OPIUM 16.2; 6 MG/1; MG/1
60 SUPPOSITORY RECTAL EVERY 8 HOURS
Status: DISCONTINUED | OUTPATIENT
Start: 2020-09-30 | End: 2020-10-01 | Stop reason: HOSPADM

## 2020-09-30 RX ORDER — LIDOCAINE HYDROCHLORIDE 10 MG/ML
1 INJECTION, SOLUTION EPIDURAL; INFILTRATION; INTRACAUDAL; PERINEURAL ONCE
Status: DISCONTINUED | OUTPATIENT
Start: 2020-09-30 | End: 2020-09-30 | Stop reason: HOSPADM

## 2020-09-30 RX ORDER — MIDAZOLAM HYDROCHLORIDE 1 MG/ML
INJECTION, SOLUTION INTRAMUSCULAR; INTRAVENOUS
Status: DISCONTINUED | OUTPATIENT
Start: 2020-09-30 | End: 2020-09-30

## 2020-09-30 RX ORDER — SODIUM CHLORIDE 0.9 % (FLUSH) 0.9 %
3 SYRINGE (ML) INJECTION EVERY 8 HOURS
Status: DISCONTINUED | OUTPATIENT
Start: 2020-09-30 | End: 2020-09-30 | Stop reason: HOSPADM

## 2020-09-30 RX ORDER — LIDOCAINE HCL/PF 100 MG/5ML
SYRINGE (ML) INTRAVENOUS
Status: DISCONTINUED | OUTPATIENT
Start: 2020-09-30 | End: 2020-09-30

## 2020-09-30 RX ORDER — LISINOPRIL 40 MG/1
40 TABLET ORAL DAILY
Status: DISCONTINUED | OUTPATIENT
Start: 2020-09-30 | End: 2020-10-01 | Stop reason: HOSPADM

## 2020-09-30 RX ORDER — HYDROMORPHONE HYDROCHLORIDE 2 MG/ML
0.2 INJECTION, SOLUTION INTRAMUSCULAR; INTRAVENOUS; SUBCUTANEOUS EVERY 5 MIN PRN
Status: DISCONTINUED | OUTPATIENT
Start: 2020-09-30 | End: 2020-09-30 | Stop reason: HOSPADM

## 2020-09-30 RX ORDER — SODIUM CHLORIDE, SODIUM LACTATE, POTASSIUM CHLORIDE, CALCIUM CHLORIDE 600; 310; 30; 20 MG/100ML; MG/100ML; MG/100ML; MG/100ML
500 INJECTION, SOLUTION INTRAVENOUS ONCE
Status: DISCONTINUED | OUTPATIENT
Start: 2020-09-30 | End: 2020-09-30 | Stop reason: HOSPADM

## 2020-09-30 RX ORDER — ROCURONIUM BROMIDE 10 MG/ML
INJECTION, SOLUTION INTRAVENOUS
Status: DISCONTINUED | OUTPATIENT
Start: 2020-09-30 | End: 2020-09-30

## 2020-09-30 RX ORDER — SODIUM CHLORIDE, SODIUM LACTATE, POTASSIUM CHLORIDE, CALCIUM CHLORIDE 600; 310; 30; 20 MG/100ML; MG/100ML; MG/100ML; MG/100ML
INJECTION, SOLUTION INTRAVENOUS CONTINUOUS
Status: DISCONTINUED | OUTPATIENT
Start: 2020-09-30 | End: 2020-10-01 | Stop reason: HOSPADM

## 2020-09-30 RX ORDER — PHENYLEPHRINE HYDROCHLORIDE 10 MG/ML
INJECTION INTRAVENOUS
Status: DISCONTINUED | OUTPATIENT
Start: 2020-09-30 | End: 2020-09-30

## 2020-09-30 RX ORDER — DEXAMETHASONE SODIUM PHOSPHATE 4 MG/ML
INJECTION, SOLUTION INTRA-ARTICULAR; INTRALESIONAL; INTRAMUSCULAR; INTRAVENOUS; SOFT TISSUE
Status: DISCONTINUED | OUTPATIENT
Start: 2020-09-30 | End: 2020-09-30

## 2020-09-30 RX ORDER — SUCCINYLCHOLINE CHLORIDE 20 MG/ML
INJECTION INTRAMUSCULAR; INTRAVENOUS
Status: DISCONTINUED | OUTPATIENT
Start: 2020-09-30 | End: 2020-09-30

## 2020-09-30 RX ORDER — HYDROCODONE BITARTRATE AND ACETAMINOPHEN 5; 325 MG/1; MG/1
1 TABLET ORAL EVERY 6 HOURS PRN
Qty: 15 TABLET | Refills: 0 | Status: SHIPPED | OUTPATIENT
Start: 2020-09-30 | End: 2020-10-10

## 2020-09-30 RX ORDER — OXYCODONE HYDROCHLORIDE 5 MG/1
5 TABLET ORAL
Status: DISCONTINUED | OUTPATIENT
Start: 2020-09-30 | End: 2020-10-01 | Stop reason: HOSPADM

## 2020-09-30 RX ORDER — METOPROLOL TARTRATE 50 MG/1
50 TABLET ORAL 2 TIMES DAILY
Status: DISCONTINUED | OUTPATIENT
Start: 2020-09-30 | End: 2020-10-01 | Stop reason: HOSPADM

## 2020-09-30 RX ADMIN — MIDAZOLAM 2 MG: 1 INJECTION INTRAMUSCULAR; INTRAVENOUS at 09:09

## 2020-09-30 RX ADMIN — PHENYLEPHRINE HYDROCHLORIDE 100 MCG: 10 INJECTION INTRAVENOUS at 09:09

## 2020-09-30 RX ADMIN — FENTANYL CITRATE 25 MCG: 50 INJECTION, SOLUTION INTRAMUSCULAR; INTRAVENOUS at 12:09

## 2020-09-30 RX ADMIN — SODIUM CHLORIDE, SODIUM LACTATE, POTASSIUM CHLORIDE, AND CALCIUM CHLORIDE: .6; .31; .03; .02 INJECTION, SOLUTION INTRAVENOUS at 10:09

## 2020-09-30 RX ADMIN — LIDOCAINE HYDROCHLORIDE 75 MG: 20 INJECTION, SOLUTION INTRAVENOUS at 09:09

## 2020-09-30 RX ADMIN — METOPROLOL TARTRATE 50 MG: 50 TABLET, FILM COATED ORAL at 10:09

## 2020-09-30 RX ADMIN — SUCCINYLCHOLINE CHLORIDE 120 MG: 20 INJECTION, SOLUTION INTRAMUSCULAR; INTRAVENOUS; PARENTERAL at 09:09

## 2020-09-30 RX ADMIN — DEXAMETHASONE SODIUM PHOSPHATE 4 MG: 4 INJECTION, SOLUTION INTRA-ARTICULAR; INTRALESIONAL; INTRAMUSCULAR; INTRAVENOUS; SOFT TISSUE at 09:09

## 2020-09-30 RX ADMIN — ROCURONIUM BROMIDE 5 MG: 10 INJECTION, SOLUTION INTRAVENOUS at 09:09

## 2020-09-30 RX ADMIN — ONDANSETRON 4 MG: 2 INJECTION, SOLUTION INTRAMUSCULAR; INTRAVENOUS at 09:09

## 2020-09-30 RX ADMIN — ACETAMINOPHEN 1000 MG: 10 INJECTION, SOLUTION INTRAVENOUS at 09:09

## 2020-09-30 RX ADMIN — FENTANYL CITRATE 25 MCG: 50 INJECTION, SOLUTION INTRAMUSCULAR; INTRAVENOUS at 01:09

## 2020-09-30 RX ADMIN — GLYCOPYRROLATE 0.2 MG: 0.2 INJECTION, SOLUTION INTRAMUSCULAR; INTRAVITREAL at 09:09

## 2020-09-30 RX ADMIN — PROPOFOL 120 MG: 10 INJECTION, EMULSION INTRAVENOUS at 09:09

## 2020-09-30 RX ADMIN — SIMVASTATIN 40 MG: 40 TABLET, FILM COATED ORAL at 10:09

## 2020-09-30 RX ADMIN — OXYCODONE HYDROCHLORIDE 5 MG: 5 TABLET ORAL at 12:09

## 2020-09-30 RX ADMIN — HYDROCODONE BITARTRATE AND ACETAMINOPHEN 1 TABLET: 5; 325 TABLET ORAL at 04:09

## 2020-09-30 RX ADMIN — CEFTRIAXONE 2 G: 2 INJECTION, SOLUTION INTRAVENOUS at 09:09

## 2020-09-30 RX ADMIN — SODIUM CHLORIDE, SODIUM GLUCONATE, SODIUM ACETATE, POTASSIUM CHLORIDE, MAGNESIUM CHLORIDE, SODIUM PHOSPHATE, DIBASIC, AND POTASSIUM PHOSPHATE: .53; .5; .37; .037; .03; .012; .00082 INJECTION, SOLUTION INTRAVENOUS at 09:09

## 2020-09-30 RX ADMIN — ATROPA BELLADONNA AND OPIUM 60 MG: 16.2; 6 SUPPOSITORY RECTAL at 10:09

## 2020-09-30 RX ADMIN — SODIUM CHLORIDE, SODIUM LACTATE, POTASSIUM CHLORIDE, AND CALCIUM CHLORIDE: .6; .31; .03; .02 INJECTION, SOLUTION INTRAVENOUS at 01:09

## 2020-09-30 RX ADMIN — FENTANYL CITRATE 100 MCG: 50 INJECTION, SOLUTION INTRAMUSCULAR; INTRAVENOUS at 09:09

## 2020-09-30 NOTE — PLAN OF CARE
Pt prepared for surgery resting in bed with significant other, Brielle, at the bedside. Brielle signed up for text messaging. Warm blankets provided. IV fluids infusing. SCDs on. Fall risk agreement signed and armband placed. Consents signed.

## 2020-09-30 NOTE — PLAN OF CARE
Report to Bertha pt to room 424 sr up x 2 urine draining pink tinged to cherry colored urine no clots noted vs wnl pain  0/10 no n+v awake alert orient x 4 cont irrigation cont via 2 way sharma cath Joanna Nixon saw pt in recovery room

## 2020-09-30 NOTE — ASSESSMENT & PLAN NOTE
Chronic, controlled.  Latest blood pressure and vitals reviewed-   Temp:  [97.2 °F (36.2 °C)-98.1 °F (36.7 °C)]   Pulse:  [56-62]   Resp:  [14-20]   BP: (136-168)/(60-75)   SpO2:  [95 %-98 %] .   Home meds for hypertension were reviewed and noted below. Hospital anti-hypertensive changes were made as shown below.  Hypertension Medications             amlodipine (NORVASC) 10 MG tablet Take 10 mg by mouth once daily. Take 1/2 daily am    hydrochlorothiazide (HYDRODIURIL) 50 MG tablet Take 50 mg by mouth once daily. Take 1/2 daily    lisinopriL (PRINIVIL,ZESTRIL) 40 MG tablet TAKE ONE-HALF TABLET BY MOUTH EVERY DAY FOR HEART/BLOOD PRESSURE    metoprolol tartrate (LOPRESSOR) 50 MG tablet Take 50 mg by mouth 2 (two) times daily.      Hospital Medications             amLODIPine tablet 10 mg 10 mg, Oral, Daily    lisinopriL tablet 40 mg 40 mg, Oral, Daily    metoprolol tartrate (LOPRESSOR) tablet 50 mg 50 mg, Oral, 2 times daily        Will utilize p.r.n. blood pressure medication only if patient's blood pressure greater than  180/110 and he develops symptoms such as worsening chest pain or shortness of breath.

## 2020-09-30 NOTE — SUBJECTIVE & OBJECTIVE
Past Medical History:   Diagnosis Date    Arthritis     BPH (benign prostatic hypertrophy)     Encounter for blood transfusion     Hypertension     PTSD (post-traumatic stress disorder)     PUD (peptic ulcer disease)        Past Surgical History:   Procedure Laterality Date    ANKLE SURGERY Left     HERNIA REPAIR      JOINT REPLACEMENT      right knee     stomach ulcer surgery      TRANSRECTAL ULTRASOUND EXAMINATION N/A 9/14/2020    Procedure: TRANSRECTAL ULTRASOUND;  Surgeon: Joanna Dowell MD;  Location: [facility] OR;  Service: Urology;  Laterality: N/A;       Review of patient's allergies indicates:  No Known Allergies    No current facility-administered medications on file prior to encounter.      Current Outpatient Medications on File Prior to Encounter   Medication Sig    amlodipine (NORVASC) 10 MG tablet Take 10 mg by mouth once daily. Take 1/2 daily am    atorvastatin (LIPITOR) 40 MG tablet Take 40 mg by mouth nightly.    cholecalciferol, vitamin D3, 400 unit Tab Take 400 Units by mouth 2 (two) times daily. Take 2 tabs     finasteride (PROSCAR) 5 mg tablet Take 1 tablet (5 mg total) by mouth once daily.    hydrocodone-acetaminophen 7.5-325mg (NORCO) 7.5-325 mg per tablet Take 1 tablet by mouth every 6 (six) hours as needed for Pain.    lidocaine HCL 2% (XYLOCAINE) 2 % jelly Apply topically as needed.    metoprolol tartrate (LOPRESSOR) 50 MG tablet Take 50 mg by mouth 2 (two) times daily.    potassium chloride (MICRO-K) 10 MEQ CpSR Take 10 mEq by mouth once daily. 3 tabs  2 x a day     aspirin (ECOTRIN) 81 MG EC tablet Take 81 mg by mouth once daily.    fish oil-omega-3 fatty acids 300-1,000 mg capsule Take 1,000 mg by mouth once daily.    hydrochlorothiazide (HYDRODIURIL) 50 MG tablet Take 50 mg by mouth once daily. Take 1/2 daily    simvastatin (ZOCOR) 40 MG tablet Take 40 mg by mouth every evening.     Family History     None        Tobacco Use    Smoking status: Former Smoker  "    Packs/day: 2.00     Years: 13.00     Pack years: 26.00     Quit date: 1979     Years since quittin.6    Smokeless tobacco: Never Used   Substance and Sexual Activity    Alcohol use: Not Currently     Comment: "quit"    Drug use: No    Sexual activity: Yes     Partners: Female     Review of Systems   Constitutional: Negative for chills, fatigue and fever.   HENT: Negative for congestion, sore throat and trouble swallowing.    Eyes: Negative for photophobia and visual disturbance.   Respiratory: Negative for cough and shortness of breath.    Cardiovascular: Negative for chest pain, palpitations and leg swelling.   Gastrointestinal: Negative for abdominal pain, diarrhea, nausea and vomiting.   Endocrine: Negative for polyphagia and polyuria.   Genitourinary: Positive for difficulty urinating, frequency and urgency. Negative for dysuria and flank pain.   Musculoskeletal: Negative for arthralgias, back pain and gait problem.   Skin: Negative for color change, pallor, rash and wound.   Neurological: Negative for dizziness, syncope and light-headedness.   Psychiatric/Behavioral: Negative for agitation, behavioral problems and confusion.   All other systems reviewed and are negative.    Objective:     Vital Signs (Most Recent):  Temp: 98 °F (36.7 °C) (20 1230)  Pulse: (!) 56 (20 1315)  Resp: 14 (20 1315)  BP: 136/60 (20 1315)  SpO2: 97 % (20 1315) Vital Signs (24h Range):  Temp:  [97.2 °F (36.2 °C)-98.1 °F (36.7 °C)] 98 °F (36.7 °C)  Pulse:  [56-62] 56  Resp:  [14-20] 14  SpO2:  [95 %-98 %] 97 %  BP: (136-168)/(60-75) 136/60     Weight: 114.3 kg (252 lb)  Body mass index is 37.21 kg/m².    Physical Exam  Vitals signs and nursing note reviewed.   Constitutional:       General: He is not in acute distress.     Appearance: Normal appearance. He is well-developed. He is not ill-appearing.   HENT:      Head: Normocephalic and atraumatic.      Right Ear: External ear normal.      " Left Ear: External ear normal.      Nose: Nose normal. No congestion or rhinorrhea.      Mouth/Throat:      Mouth: Mucous membranes are moist.      Pharynx: Oropharynx is clear. No oropharyngeal exudate or posterior oropharyngeal erythema.   Eyes:      General: No scleral icterus.     Conjunctiva/sclera: Conjunctivae normal.      Pupils: Pupils are equal, round, and reactive to light.   Neck:      Musculoskeletal: Normal range of motion and neck supple.      Vascular: No JVD.   Cardiovascular:      Rate and Rhythm: Normal rate and regular rhythm.      Pulses: Normal pulses.      Heart sounds: Normal heart sounds. No murmur. No gallop.    Pulmonary:      Effort: Pulmonary effort is normal. No respiratory distress.      Breath sounds: Normal breath sounds. No stridor. No wheezing, rhonchi or rales.   Abdominal:      General: Bowel sounds are normal. There is no distension.      Palpations: Abdomen is soft.      Tenderness: There is no abdominal tenderness.   Genitourinary:     Comments: CBI in place with hematuria noted  Musculoskeletal: Normal range of motion.         General: No swelling or tenderness.   Skin:     General: Skin is warm and dry.      Capillary Refill: Capillary refill takes 2 to 3 seconds.      Coloration: Skin is not jaundiced or pale.   Neurological:      General: No focal deficit present.      Mental Status: He is alert and oriented to person, place, and time.      Cranial Nerves: No cranial nerve deficit.      Sensory: No sensory deficit.   Psychiatric:         Mood and Affect: Mood normal.         Behavior: Behavior normal.         Thought Content: Thought content normal.           CRANIAL NERVES     CN III, IV, VI   Pupils are equal, round, and reactive to light.       Significant Labs:  Preop labs reviewed and within normal limits      Significant Imaging: I have reviewed all pertinent imaging results/findings within the past 24 hours.

## 2020-09-30 NOTE — HPI
Eron Turk is a 69-year-old  male with past medical history significant for hypertension, BPH, and PTSD presenting today status post TURP procedure by Dr. Dowell.  Patient was experiencing urinary obstruction preoperatively with significant BPH and opted for surgical intervention.  Patient seen postoperatively and doing well.  He has CBI in place with hematuria noted.  Patient denies any acute pain at this time.  He denies chest pain, shortness of breath, nausea vomiting.  He is awake and alert and answering all questions appropriately.  Patient will be observed overnight.

## 2020-09-30 NOTE — ANESTHESIA POSTPROCEDURE EVALUATION
Anesthesia Post Evaluation    Patient: Eron Turk    Procedure(s) Performed: Procedure(s) (LRB):  TURP (TRANSURETHRAL RESECTION OF PROSTATE) (N/A)    Final Anesthesia Type: general    Patient location during evaluation: PACU  Patient participation: Yes- Able to Participate  Level of consciousness: sedated and awake  Post-procedure vital signs: reviewed and stable  Pain management: adequate  Airway patency: patent    PONV status at discharge: No PONV  Anesthetic complications: no      Cardiovascular status: blood pressure returned to baseline  Respiratory status: spontaneous ventilation  Hydration status: euvolemic  Follow-up not needed.          Vitals Value Taken Time   /63 09/30/20 1106   Temp  09/30/20 1107   Pulse 58 09/30/20 1106   Resp 17 09/30/20 1106   SpO2 97 % 09/30/20 1106   Vitals shown include unvalidated device data.      No case tracking events are documented in the log.      Pain/Tori Score: No data recorded

## 2020-09-30 NOTE — TELEPHONE ENCOUNTER
----- Message from Joanna Dowell MD sent at 9/30/2020 11:18 AM CDT -----  F/u next Wednesday Nurse visit for fill and pull in the morning (before 10am) and then return for a pvr that bpm  F/u in 2 weeks for nurse visit for pvr by scan  F/u in 4-5 weeks with me in person for pvr by scan (first) and in and out cath with COUDE    He's staying overnight. But you can talk to his sig other about dates

## 2020-09-30 NOTE — TRANSFER OF CARE
"Anesthesia Transfer of Care Note    Patient: Eron Turk    Procedure(s) Performed: Procedure(s) (LRB):  TURP (TRANSURETHRAL RESECTION OF PROSTATE) (N/A)    Patient location: PACU    Anesthesia Type: general    Transport from OR: Transported from OR on 2-3 L/min O2 by NC with adequate spontaneous ventilation    Post pain: adequate analgesia    Post assessment: no apparent anesthetic complications and tolerated procedure well    Post vital signs: stable    Level of consciousness: awake, oriented and alert    Nausea/Vomiting: no nausea/vomiting    Complications: none    Transfer of care protocol was followed      Last vitals:   Visit Vitals  BP (!) 156/70 (BP Location: Left arm, Patient Position: Lying)   Pulse 62   Temp 36.7 °C (98.1 °F)   Resp 18   Ht 5' 9" (1.753 m)   Wt 114.3 kg (252 lb)   SpO2 98%   BMI 37.21 kg/m²     "

## 2020-09-30 NOTE — PLAN OF CARE
Released from Pacu when criteria met pain controlled skin w+d No nausea No emesis dsg dry intact aaox4 encouraged deep breaths Pt has all belongings  2 way sharma irrigation urine in bag clear to pink tinged no clots   Feels pressure in bottom from cath  poc discusssed

## 2020-09-30 NOTE — ANESTHESIA PROCEDURE NOTES
Intubation  Performed by: Efrain Marie CRNA  Authorized by: Jack Poon MD     Intubation:     Induction:  Intravenous    Intubated:  Postinduction    Attempts:  1    Attempted By:  CRNA    Method of Intubation:  Direct    Blade:  Mick 3    Laryngeal View Grade: Grade IIA - cords partially seen      Difficult Airway Encountered?: No      Complications:  None    Airway Device:  Oral endotracheal tube    Airway Device Size:  7.5    Style/Cuff Inflation:  Cuffed (inflated to minimal occlusive pressure)    Tube secured:  21    Secured at:  The lips    Placement Verified By:  Capnometry    Complicating Factors:  None    Findings Post-Intubation:  BS equal bilateral

## 2020-09-30 NOTE — ASSESSMENT & PLAN NOTE
Status post TURP by Dr. Dowell  CBI in place  Follow urology recommendations  Oral pain control prn and B&O suppository ordered  CBC in a.m.

## 2020-09-30 NOTE — H&P
[facility] Medicine  History & Physical    Patient Name: Eron Turk  MRN: [id number]  Admission Date: 9/30/2020  Attending Physician: Sean Ely MD   Primary Care Provider: Marianne More NP         Patient information was obtained from patient and past medical records.     Subjective:     Principal Problem:BPH (benign prostatic hyperplasia)    Chief Complaint: No chief complaint on file.       HPI: Eron Turk is a 69-year-old  male with past medical history significant for hypertension, BPH, and PTSD presenting today status post TURP procedure by Dr. Dowell.  Patient was experiencing urinary obstruction preoperatively with significant BPH and opted for surgical intervention.  Patient seen postoperatively and doing well.  He has CBI in place with hematuria noted.  Patient denies any acute pain at this time.  He denies chest pain, shortness of breath, nausea vomiting.  He is awake and alert and answering all questions appropriately.  Patient will be observed overnight.      Past Medical History:   Diagnosis Date    Arthritis     BPH (benign prostatic hypertrophy)     Encounter for blood transfusion     Hypertension     PTSD (post-traumatic stress disorder)     PUD (peptic ulcer disease)        Past Surgical History:   Procedure Laterality Date    ANKLE SURGERY Left     HERNIA REPAIR      JOINT REPLACEMENT      right knee     stomach ulcer surgery      TRANSRECTAL ULTRASOUND EXAMINATION N/A 9/14/2020    Procedure: TRANSRECTAL ULTRASOUND;  Surgeon: Joanna Dowell MD;  Location: [facility];  Service: Urology;  Laterality: N/A;       Review of patient's allergies indicates:  No Known Allergies    No current facility-administered medications on file prior to encounter.      Current Outpatient Medications on File Prior to Encounter   Medication Sig    amlodipine (NORVASC) 10 MG tablet Take 10 mg by mouth once daily. Take 1/2 daily am     "atorvastatin (LIPITOR) 40 MG tablet Take 40 mg by mouth nightly.    cholecalciferol, vitamin D3, 400 unit Tab Take 400 Units by mouth 2 (two) times daily. Take 2 tabs     finasteride (PROSCAR) 5 mg tablet Take 1 tablet (5 mg total) by mouth once daily.    hydrocodone-acetaminophen 7.5-325mg (NORCO) 7.5-325 mg per tablet Take 1 tablet by mouth every 6 (six) hours as needed for Pain.    lidocaine HCL 2% (XYLOCAINE) 2 % jelly Apply topically as needed.    metoprolol tartrate (LOPRESSOR) 50 MG tablet Take 50 mg by mouth 2 (two) times daily.    potassium chloride (MICRO-K) 10 MEQ CpSR Take 10 mEq by mouth once daily. 3 tabs  2 x a day     aspirin (ECOTRIN) 81 MG EC tablet Take 81 mg by mouth once daily.    fish oil-omega-3 fatty acids 300-1,000 mg capsule Take 1,000 mg by mouth once daily.    hydrochlorothiazide (HYDRODIURIL) 50 MG tablet Take 50 mg by mouth once daily. Take 1/2 daily    simvastatin (ZOCOR) 40 MG tablet Take 40 mg by mouth every evening.     Family History     None        Tobacco Use    Smoking status: Former Smoker     Packs/day: 2.00     Years: 13.00     Pack years: 26.00     Quit date: 1979     Years since quittin.6    Smokeless tobacco: Never Used   Substance and Sexual Activity    Alcohol use: Not Currently     Comment: "quit"    Drug use: No    Sexual activity: Yes     Partners: Female     Review of Systems   Constitutional: Negative for chills, fatigue and fever.   HENT: Negative for congestion, sore throat and trouble swallowing.    Eyes: Negative for photophobia and visual disturbance.   Respiratory: Negative for cough and shortness of breath.    Cardiovascular: Negative for chest pain, palpitations and leg swelling.   Gastrointestinal: Negative for abdominal pain, diarrhea, nausea and vomiting.   Endocrine: Negative for polyphagia and polyuria.   Genitourinary: Positive for difficulty urinating, frequency and urgency. Negative for dysuria and flank pain. "   Musculoskeletal: Negative for arthralgias, back pain and gait problem.   Skin: Negative for color change, pallor, rash and wound.   Neurological: Negative for dizziness, syncope and light-headedness.   Psychiatric/Behavioral: Negative for agitation, behavioral problems and confusion.   All other systems reviewed and are negative.    Objective:     Vital Signs (Most Recent):  Temp: 98 °F (36.7 °C) (09/30/20 1230)  Pulse: (!) 56 (09/30/20 1315)  Resp: 14 (09/30/20 1315)  BP: 136/60 (09/30/20 1315)  SpO2: 97 % (09/30/20 1315) Vital Signs (24h Range):  Temp:  [97.2 °F (36.2 °C)-98.1 °F (36.7 °C)] 98 °F (36.7 °C)  Pulse:  [56-62] 56  Resp:  [14-20] 14  SpO2:  [95 %-98 %] 97 %  BP: (136-168)/(60-75) 136/60     Weight: 114.3 kg (252 lb)  Body mass index is 37.21 kg/m².    Physical Exam  Vitals signs and nursing note reviewed.   Constitutional:       General: He is not in acute distress.     Appearance: Normal appearance. He is well-developed. He is not ill-appearing.   HENT:      Head: Normocephalic and atraumatic.      Right Ear: External ear normal.      Left Ear: External ear normal.      Nose: Nose normal. No congestion or rhinorrhea.      Mouth/Throat:      Mouth: Mucous membranes are moist.      Pharynx: Oropharynx is clear. No oropharyngeal exudate or posterior oropharyngeal erythema.   Eyes:      General: No scleral icterus.     Conjunctiva/sclera: Conjunctivae normal.      Pupils: Pupils are equal, round, and reactive to light.   Neck:      Musculoskeletal: Normal range of motion and neck supple.      Vascular: No JVD.   Cardiovascular:      Rate and Rhythm: Normal rate and regular rhythm.      Pulses: Normal pulses.      Heart sounds: Normal heart sounds. No murmur. No gallop.    Pulmonary:      Effort: Pulmonary effort is normal. No respiratory distress.      Breath sounds: Normal breath sounds. No stridor. No wheezing, rhonchi or rales.   Abdominal:      General: Bowel sounds are normal. There is no  distension.      Palpations: Abdomen is soft.      Tenderness: There is no abdominal tenderness.   Genitourinary:     Comments: CBI in place with hematuria noted  Musculoskeletal: Normal range of motion.         General: No swelling or tenderness.   Skin:     General: Skin is warm and dry.      Capillary Refill: Capillary refill takes 2 to 3 seconds.      Coloration: Skin is not jaundiced or pale.   Neurological:      General: No focal deficit present.      Mental Status: He is alert and oriented to person, place, and time.      Cranial Nerves: No cranial nerve deficit.      Sensory: No sensory deficit.   Psychiatric:         Mood and Affect: Mood normal.         Behavior: Behavior normal.         Thought Content: Thought content normal.           CRANIAL NERVES     CN III, IV, VI   Pupils are equal, round, and reactive to light.       Significant Labs:  Preop labs reviewed and within normal limits      Significant Imaging: I have reviewed all pertinent imaging results/findings within the past 24 hours.    Assessment/Plan:     * BPH (benign prostatic hyperplasia)  Status post TURP by Dr. Dowell  CBI in place  Follow urology recommendations  Oral pain control prn and B&O suppository ordered  CBC in a.m.      Essential hypertension  Chronic, controlled.  Latest blood pressure and vitals reviewed-   Temp:  [97.2 °F (36.2 °C)-98.1 °F (36.7 °C)]   Pulse:  [56-62]   Resp:  [14-20]   BP: (136-168)/(60-75)   SpO2:  [95 %-98 %] .   Home meds for hypertension were reviewed and noted below. Hospital anti-hypertensive changes were made as shown below.  Hypertension Medications             amlodipine (NORVASC) 10 MG tablet Take 10 mg by mouth once daily. Take 1/2 daily am    hydrochlorothiazide (HYDRODIURIL) 50 MG tablet Take 50 mg by mouth once daily. Take 1/2 daily    lisinopriL (PRINIVIL,ZESTRIL) 40 MG tablet TAKE ONE-HALF TABLET BY MOUTH EVERY DAY FOR HEART/BLOOD PRESSURE    metoprolol tartrate (LOPRESSOR) 50 MG tablet  Take 50 mg by mouth 2 (two) times daily.      Hospital Medications             amLODIPine tablet 10 mg 10 mg, Oral, Daily    lisinopriL tablet 40 mg 40 mg, Oral, Daily    metoprolol tartrate (LOPRESSOR) tablet 50 mg 50 mg, Oral, 2 times daily        Will utilize p.r.n. blood pressure medication only if patient's blood pressure greater than  180/110 and he develops symptoms such as worsening chest pain or shortness of breath.        VTE Risk Mitigation (From admission, onward)         Ordered     IP VTE HIGH RISK PATIENT  Once      09/30/20 0822     Place sequential compression device  Until discontinued      09/30/20 0822                   Joanna Marquez NP  Department of Hospital Medicine   [facility]

## 2020-10-01 PROCEDURE — 25000003 PHARM REV CODE 250: Performed by: NURSE PRACTITIONER

## 2020-10-01 PROCEDURE — 25000003 PHARM REV CODE 250: Performed by: UROLOGY

## 2020-10-01 RX ORDER — PHENOL/SODIUM PHENOLATE
AEROSOL, SPRAY (ML) MUCOUS MEMBRANE
Status: ON HOLD | COMMUNITY
Start: 2020-07-23 | End: 2020-10-01 | Stop reason: HOSPADM

## 2020-10-01 RX ORDER — TAMSULOSIN HYDROCHLORIDE 0.4 MG/1
CAPSULE ORAL
COMMUNITY
Start: 2020-08-05 | End: 2020-11-05 | Stop reason: ALTCHOICE

## 2020-10-01 RX ADMIN — AMLODIPINE BESYLATE 10 MG: 5 TABLET ORAL at 09:10

## 2020-10-01 RX ADMIN — LISINOPRIL 40 MG: 40 TABLET ORAL at 09:10

## 2020-10-01 RX ADMIN — ATROPA BELLADONNA AND OPIUM 60 MG: 16.2; 6 SUPPOSITORY RECTAL at 07:10

## 2020-10-01 RX ADMIN — METOPROLOL TARTRATE 50 MG: 50 TABLET, FILM COATED ORAL at 09:10

## 2020-10-01 NOTE — NURSING
Pt given verbal & written dc instructions; received verbalized understanding. IV removed w/ tip intact. Safely wheeled out to vehicle w/ wife to drive.

## 2020-10-01 NOTE — PROGRESS NOTES
Urology Consult Progress Note-  Staff: Delmer/Erin/Bakari/Poncho    Referring physician or department: [facility]    Subjective:      Eron Turk is a 69 y.o. male with bph and retention s/p turp.      Minimal  Pain overnight per nurse  20cc removed from sharma, cbi dc'd and ambulated this morning, urine cherry red  Spoke with his wife. Urine light pink     Objective:     Temp:  [96.2 °F (35.7 °C)-98 °F (36.7 °C)] 98 °F (36.7 °C)  Pulse:  [56-72] 62  Resp:  [15-20] 18  SpO2:  [94 %-97 %] 96 %  BP: (125-145)/(57-76) 128/60  I/O last 3 completed shifts:  In: 2036.7 [P.O.:1060; I.V.:976.7]  Out: 71550 [Urine:58220]  I/O this shift:  In: -   Out: 1300 [Urine:1300]    Tele note    Labs:     No results for input(s): NA, K, CL, CO2, BUN, CREATININE, LABGLOM, GLUCOSE, CALCIUM in the last 168 hours.  Recent Labs   Lab 09/25/20  0834   WBC 11.00   HGB 13.8*   HCT 41.7          Radiology:  See hpi    Assessment:         1. Urinary retention    2. BPH (benign prostatic hyperplasia)        S/p turp. Pod 1     Plan:     D/c home with sharma  Return sooner if having sig bloody urine with clots    Long term plan:  · Return Wednesday as a urology nurse visit to remove catheter. The nurse will fill your bladder, remove your catheter and make sure you empty your bladder. If you are unable to void you will be asked to return that afternoon to check and ensure that your emptying with a little ultrasound in the office.  · Return in 2 weeks for post op nurse visit, however doctor will see you at this visit  · Return in 4-5 weeks with . She will check to make sure you are emptying and urinating well. She will also check for any scar tissue.   · Home with norco/hydrocodone 5/325 for pain, dispense 15; cipro twice a day x 3days (or finish augmentin if you have 3 more days left), lidocaine jelly for catheter pain. Prescriptions sent by me already   · Discontinue flomax and finasteride once prescription runs out       Joanna Dowell MD  [facility] (Erin/Bakari/Delmer/Poncho)   Office: [phone]

## 2020-10-01 NOTE — PLAN OF CARE
Pt is cleared from  for. D/C.       10/01/20 5531   Final Note   Assessment Type Final Discharge Note   Anticipated Discharge Disposition Home   Hospital Follow Up  Appt(s) scheduled? Yes

## 2020-10-01 NOTE — PLAN OF CARE
Plan of care reviewed with patient, verbalizes understanding. VSS. Continuous IV fluids infusing per order. CBI maintained with light pink urine draining. Safety maintained, bed in lowest position, wheels locked, call light in reach.

## 2020-10-01 NOTE — DISCHARGE INSTRUCTIONS
Take only 1 antibiotic--start Cipro, do not finish the Augmentin as you had said you didn't have enough to finish    's Post-op Instructions for TURP/TRANSURETHRAL RESECTION OF PROSTATE    Specific instructions for this patient:  · Return Wednesday as a urology nurse visit to remove catheter. The nurse will fill your bladder, remove your catheter and make sure you empty your bladder. If you are unable to void you will be asked to return that afternoon to check and ensure that your emptying with a little ultrasound in the office.  · Return in 2 weeks for post op nurse visit, however doctor will see you at this visit  · Return in 4-5 weeks with . She will check to make sure you are emptying and urinating well. She will also check for any scar tissue.   · Home with norco/hydrocodone 5/325 for pain, dispense 15; cipro twice a day x 3days (or finish augmentin if you have 3 more days left), lidocaine jelly for catheter pain   · Discontinue flomax and finasteride once prescription runs out      Post Op Catheter Care:   · While the catheter is in clean the tip of the penis and the catheter, where it comes out of the penis, at least once daily and up to 2-3x a day and apply lidocaine jelly (to help numb the area- call our office and ask for a prescription if you did not receive one) or Ky Jelly up to 4x a day to where the catheter exits the penis.   · It is ok to disconnect the sharma from the tubing prior to taking a shower but before reconnecting wipe each end with an alcohol wipe.   · Expect blood in urine with the catheter, especially with movement or if you are straining to have a bowel movement.  · Try not to let the catheter pull or tug.     Following a TURP:  Urinating after TURP  You'll be unable to urinate normally at first because your urethra will be swollen. The catheter used to flush out your bladder during the operation will be left in place for a while to allow you to pass urine  until the swelling goes down.  When the catheter is removed,  t may burn in the urethra for many days, to weeks after the surgery. You can try over the counter AZO up to 2x a day to help with the burning.  You may also have some pain in the perineum or seated area for a few days.     You may also have some overactive bladder (frequent urination with significant urge to urinate and feeling like you cannot hold your urine) or leakage of urine (before or after urinating) for a few weeks after surgery.  We can write for temporary overactive bladder medication as long as we know that you have a history of emptying her bladder completely.  We have checked this previously in clinic the little ultrasound.  If the residuals were less than 100 cc, the then overactive bladder medication for 30 days can be called in and give our office a call if there having significant overactive bladder.     You may continue to see blood in the urine intermittently after the catheter was removed.  Sometimes her urine may be clear and sometimes it may all of a sudden turn red.  If you are in blood thinners and it continues to become redder than give us a call for further instructions on whether not to hold the blood thinner.  The most important thing is to drink plenty of water to help dilute the urine or thinn it out so you do not get thick clots.  If you start to feel like he cannot urinate or you have very thick clots call us and or go to the ER.  Otherwise if you just have bloody urine and it becomes clear with increased fluid intake you do not need to call us.     Recovering at home  It's common to feel tired and under the weather for a week or two after having a TURP. Most men are up and about after this time, but you'll need to take things easy for up to 2 months.    For the first few weeks, you shouldn't lift or move any heavy objects (including shopping) or do any strenuous exercise. If possible, ask friends or family members if they  can help around the house.    You have no limitations in terms of walking up stairs or walking.  However yeast avoid any straddle activity, like riding a bike, for a few weeks.    It is very important not to developed constipation or if you already have constipation to try to control this with stool softeners.  If you strain to have a bowel movement you could start bleeding again.  You can take Dulcolax pills over-the-counter once daily, MiraLax 1/2 cap once daily or 2-3 fiber gummy today.  You should take all the medication of choice daily and lower the dose as necessary but trying not to skip days or this will just create diarrhea and constipation episodes.     Drinking plenty of water while you're recovering may help reduce the risk of getting a urinary tract infection (UTI) and can help clear any blood from your urine. You may also be advised to do some pelvic floor exercises to help improve your bladder control.    Any pain can usually be treated by taking over-the-counter painkillers, such as paracetamol or ibuprofen.    Returning to your normal activities  It usually takes between 3 and 6 weeks to fully recover from a TURP. Your surgeon or GP will advise you about when it's safe to return to your normal activities.    Work  When you can return to work will largely depend on your job. For example, someone who works in an office may be able to return to work sooner than someone who does heavy manual work.    In most cases, you'll be advised to take around 2 to 4 weeks off work.    Driving  Your surgeon or GP will tell you when they feel it's safe for you to drive again, which will usually be when you can comfortably carry out an emergency stop and do not require any pain medicine.    Some people reach this stage after about a week, while others may not be able to drive for a month or more.    Having sex  It will probably be around 3 or 4 weeks after your operation before you feel comfortable enough to have  sex.    Blood in your urine  After having a TURP, it's normal to occasionally notice some blood in your urine. Around a week or two after the operation, the amount of blood may increase as the scab on your prostate falls off.    Drinking plenty of fluids will help flush any blood or small blood clots out of your bladder. This will also help you avoid constipation. Straining to have a bowel movement can also result in bleeding from the prostate. To avoid this take stool softeners daily while recovering.     If the increased blood in your urine continues for longer than 48 hours, you should contact the hospital.    When to seek medical advice  While you're recovering, you should contact the hospital clinic or your GP if you develop:    · a high temperature (fever) of 38C (100.4F) or above  · severe pain while urinating  · an inability to urinate  · persistently severe or worsening blood in your urine  · These symptoms can be a sign of a problem such as internal bleeding or a urinary infection that needs to be treated.

## 2020-10-01 NOTE — NURSING
Pt ambulated 2 laps on nursing unit w/o incident. Has been drinking plenty of fluids as per previous instruction this am.

## 2020-10-01 NOTE — PLAN OF CARE
Cm completed the assessment with pt at bedside.  Pt lives with significant other, Brielle(NAVIN).  PCP is VENKAT More.  Insurance verified as Veterans.  Pt denies diabetes, dialysis and coumadin.  Disposition:  Pt will discharge to home. No needs verbalized at this time.        10/01/20 0950   Discharge Assessment   Assessment Type Discharge Planning Assessment   Confirmed/corrected address and phone number on facesheet? Yes   Assessment information obtained from? Patient   Expected Length of Stay (days) 2   Communicated expected length of stay with patient/caregiver yes   Prior to hospitilization cognitive status: Alert/Oriented   Prior to hospitalization functional status: Independent   Current cognitive status: Alert/Oriented   Current Functional Status: Independent   Facility Arrived From: home   Lives With significant other   Able to Return to Prior Arrangements yes   Is patient able to care for self after discharge? Yes   Who are your caregiver(s) and their phone number(s)? Brielle Hernandez- [phone]   Patient's perception of discharge disposition home or selfcare   Readmission Within the Last 30 Days no previous admission in last 30 days   Patient currently being followed by outpatient case management? No   Patient currently receives any other outside agency services? No   Equipment Currently Used at Home none   Do you have any problems affording any of your prescribed medications? No   Is the patient taking medications as prescribed? yes   Does the patient have transportation home? Yes   Transportation Anticipated family or friend will provide   Dialysis Name and Scheduled days na   Does the patient receive services at the Coumadin Clinic? No   Discharge Plan A Home with family   Discharge Plan B Home with family   DME Needed Upon Discharge  none   Patient/Family in Agreement with Plan yes

## 2020-10-01 NOTE — PLAN OF CARE
POC reviewed with pt; pt verbalized understanding. Pt AAO x 4. Pt repositions self independently. Moore draining light pink urine to gravity. Room air. PRN pain medication administered. Pt afebrile. PIV cdi. IVF administered. Call bell in reach, bed locked, bed alarm on, and fall band and non skid socks on. Will continue to monitor.

## 2020-10-02 VITALS
HEART RATE: 62 BPM | TEMPERATURE: 98 F | WEIGHT: 252 LBS | RESPIRATION RATE: 18 BRPM | OXYGEN SATURATION: 96 % | HEIGHT: 69 IN | SYSTOLIC BLOOD PRESSURE: 128 MMHG | DIASTOLIC BLOOD PRESSURE: 60 MMHG | BODY MASS INDEX: 37.33 KG/M2

## 2020-10-02 NOTE — HOSPITAL COURSE
· Admitted to medicine overnight for extended recovery after TURP, bipolar.  Patient was started norco as needed for pain. Continued rocephin 2g iv q24 hours inpt.  Started on complete bladder irrigation ( CBI ) and titrate to light pink overnight.  CBI discontinued  in the morning.  Patient was ambulated had no couple ambulating did not pass any blood clots.  He was cleared by Urology for discharge with Moore in place medically cleared for discharge

## 2020-10-02 NOTE — DISCHARGE SUMMARY
[facility] Hospital Medicine  Discharge Summary      Patient Name: Eron Turk  MRN: [id number]  Admission Date: 9/30/2020  Hospital Length of Stay: 0 days  Discharge Date and Time: 10/1/2020  3:31 PM  Attending Physician: No att. providers found   Discharging Provider: Sean Ely MD  Primary Care Provider: Marianne More NP      HPI:   Eron Turk is a 69-year-old  male with past medical history significant for hypertension, BPH, and PTSD presenting today status post TURP procedure by Dr. Dowell.  Patient was experiencing urinary obstruction preoperatively with significant BPH and opted for surgical intervention.  Patient seen postoperatively and doing well.  He has CBI in place with hematuria noted.  Patient denies any acute pain at this time.  He denies chest pain, shortness of breath, nausea vomiting.  He is awake and alert and answering all questions appropriately.  Patient will be observed overnight.      Procedure(s) (LRB):  TURP (TRANSURETHRAL RESECTION OF PROSTATE) (N/A)      Hospital Course:   · Admitted to medicine overnight for extended recovery after TURP, bipolar.  Patient was started norco as needed for pain. Continued rocephin 2g iv q24 hours inpt.  Started on complete bladder irrigation ( CBI ) and titrate to light pink overnight.  CBI discontinued  in the morning.  Patient was ambulated had no couple ambulating did not pass any blood clots.  He was cleared by Urology for discharge with Moore in place medically cleared for discharge       Consults:     No new Assessment & Plan notes have been filed under this hospital service since the last note was generated.  Service: Hospital Medicine    Final Active Diagnoses:    Diagnosis Date Noted POA    PRINCIPAL PROBLEM:  BPH (benign prostatic hyperplasia) [N40.0] 09/30/2020 Yes    Essential hypertension [I10] 09/30/2020 Yes      Problems Resolved During this Admission:       Discharged Condition:  stable    Disposition: Home or Self Care    Follow Up:  Follow-up Information     Nurse visit On 10/7/2020.    Why: Post-op f/u appt in avs  Contact information:  [facility]   [address] [place] [address] [address]   [place] [id number] [phone]           Joanna Dowell MD On 11/3/2020.    Specialty: Urology  Why: Post-op f/u appt in avs  Contact information:  [address] [place] [address]   [address]  [place] [id number]  [phone]                 Patient Instructions:      Notify your health care provider if you experience any of the following:  temperature >100.4     Notify your health care provider if you experience any of the following:  severe uncontrolled pain     Activity as tolerated       Significant Diagnostic Studies: Labs: BMP: No results for input(s): GLU, NA, K, CL, CO2, BUN, CREATININE, CALCIUM, MG in the last 48 hours. and CBC No results for input(s): WBC, HGB, HCT, PLT in the last 48 hours.  Microbiology:   Blood Culture No results found for: LABBLOO, Sputum Culture No results found for: GSRESP, RESPIRATORYC and Urine Culture    Lab Results   Component Value Date    LABURIN No growth 09/23/2020       Pending Diagnostic Studies:     Procedure Component Value Units Date/Time    Specimen to Pathology, Surgery Urology [[id number]] Collected: 09/30/20 1049    Order Status: Sent Lab Status: In process Updated: 09/30/20 1123         Medications:  Reconciled Home Medications:      Medication List      START taking these medications    ciprofloxacin HCl 500 MG tablet  Commonly known as: CIPRO  Take 1 tablet (500 mg total) by mouth 2 (two) times daily. Only fill if no more augmentin available for 3 days        CHANGE how you take these medications    * HYDROcodone-acetaminophen 7.5-325 mg per tablet  Commonly known as: NORCO  Take 1 tablet by mouth every 6 (six) hours as needed for Pain.  What changed: Another medication with the same name was added. Make sure you understand how and when to take  each.     * HYDROcodone-acetaminophen 5-325 mg per tablet  Commonly known as: NORCO  Take 1 tablet by mouth every 6 (six) hours as needed.  What changed: You were already taking a medication with the same name, and this prescription was added. Make sure you understand how and when to take each.         * This list has 2 medication(s) that are the same as other medications prescribed for you. Read the directions carefully, and ask your doctor or other care provider to review them with you.            CONTINUE taking these medications    amLODIPine 10 MG tablet  Commonly known as: NORVASC  Take 10 mg by mouth once daily. Take 1/2 daily am     amoxicillin-clavulanate 875-125mg 875-125 mg per tablet  Commonly known as: AUGMENTIN  Take 1 tablet by mouth 2 (two) times daily. for 10 days     aspirin 81 MG EC tablet  Commonly known as: ECOTRIN  Take 81 mg by mouth once daily.     atorvastatin 40 MG tablet  Commonly known as: LIPITOR  Take 40 mg by mouth nightly.     cholecalciferol (vitamin D3) 10 mcg (400 unit) Tab  Commonly known as: VITAMIN D3  Take 400 Units by mouth 2 (two) times daily. Take 2 tabs     finasteride 5 mg tablet  Commonly known as: PROSCAR  Take 1 tablet (5 mg total) by mouth once daily.     fish oil-omega-3 fatty acids 300-1,000 mg capsule  Take 1,000 mg by mouth once daily.     hydroCHLOROthiazide 50 MG tablet  Commonly known as: HYDRODIURIL  Take 50 mg by mouth once daily. Take 1/2 daily     lidocaine HCL 2% 2 % jelly  Commonly known as: XYLOCAINE  Apply topically as needed.     lisinopriL 40 MG tablet  Commonly known as: PRINIVIL,ZESTRIL  TAKE ONE-HALF TABLET BY MOUTH EVERY DAY FOR HEART/BLOOD PRESSURE     magnesium oxide 400 mg magnesium Tab  TAKE ONE TABLET BY MOUTH ONCE DAILY AS A MINERAL SUPPLEMENT     metoprolol tartrate 50 MG tablet  Commonly known as: LOPRESSOR  Take 50 mg by mouth 2 (two) times daily.     potassium chloride 10 MEQ Cpsr  Commonly known as: MICRO-K  Take 10 mEq by mouth once  daily. 3 tabs  2 x a day     simvastatin 40 MG tablet  Commonly known as: ZOCOR  Take 40 mg by mouth every evening.     tamsulosin 0.4 mg Cap  Commonly known as: FLOMAX  TAKE TWO CAPSULES BY MOUTH EVERY EVENING FOR BPH AFTER DINNER AS DIRECTED BY DR STACY HERNANDES        STOP taking these medications    omeprazole 20 mg Tbec            Indwelling Lines/Drains at time of discharge:   Lines/Drains/Airways     Drain                 Urethral Catheter 09/30/20 1045 Latex;Triple-lumen 22 Fr. 2 days                Time spent on the discharge of patient: 60 minutes  Patient was seen and examined on the date of discharge and determined to be suitable for discharge.         Sean Ely MD  Department of Hospital Medicine  [facility]

## 2020-10-04 LAB
FINAL PATHOLOGIC DIAGNOSIS: NORMAL
GROSS: NORMAL
Lab: NORMAL

## 2020-10-06 ENCOUNTER — TELEPHONE (OUTPATIENT)
Dept: MEDSURG UNIT | Facility: HOSPITAL | Age: 70
End: 2020-10-06

## 2020-10-07 ENCOUNTER — CLINICAL SUPPORT (OUTPATIENT)
Dept: UROLOGY | Facility: CLINIC | Age: 70
End: 2020-10-07
Payer: OTHER GOVERNMENT

## 2020-10-07 DIAGNOSIS — R33.9 URINARY RETENTION: Primary | ICD-10-CM

## 2020-10-07 LAB — POC RESIDUAL URINE VOLUME: 132 ML (ref 0–100)

## 2020-10-07 PROCEDURE — 51798 US URINE CAPACITY MEASURE: CPT | Mod: PBBFAC,PN

## 2020-10-07 PROCEDURE — 99499 UNLISTED E&M SERVICE: CPT | Mod: S$PBB,,, | Performed by: UROLOGY

## 2020-10-07 PROCEDURE — 99499 NO LOS: ICD-10-PCS | Mod: S$PBB,,, | Performed by: UROLOGY

## 2020-10-07 NOTE — PROGRESS NOTES
Patient arrived back to clinic, had urinated right before entering the clinic. Bladder scan done resulted 132 ml. Await MD for advisement.

## 2020-10-07 NOTE — PROGRESS NOTES
Patient arrived to clinic for fill and pull. Instilled 150 ml NS into bladder when patient stated he feel like he has to urinate. 30 ml saline balloon deflated, removed 22 fr sharma catheter. Patient urinated 130 ml blood tinged urine without difficulty. Discharge instructions given and will come back to clinic at 2 pm for bladder scan .

## 2020-10-14 ENCOUNTER — CLINICAL SUPPORT (OUTPATIENT)
Dept: UROLOGY | Facility: CLINIC | Age: 70
End: 2020-10-14
Payer: COMMERCIAL

## 2020-10-14 DIAGNOSIS — R33.9 URINARY RETENTION: Primary | ICD-10-CM

## 2020-10-14 LAB — POC RESIDUAL URINE VOLUME: 16 ML (ref 0–100)

## 2020-10-14 PROCEDURE — 51798 US URINE CAPACITY MEASURE: CPT | Mod: PBBFAC,PN

## 2020-10-14 NOTE — PROGRESS NOTES
Patient arrived in clinic today per  for a pvr by bladder scan  Patient ambulated to restroom successfully emptied bladder  pvr by bladder scan showed 16ml

## 2020-11-03 ENCOUNTER — TELEPHONE (OUTPATIENT)
Dept: UROLOGY | Facility: CLINIC | Age: 70
End: 2020-11-03
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
## 2020-11-03 NOTE — TELEPHONE ENCOUNTER
----- Message from Maria Elena Lopez sent at 11/3/2020  2:17 PM CST -----  Contact: call  pt [phone]    Type:  Sooner Apoointment Request    Caller is requesting a sooner appointment.  Caller declined first available appointment listed below.  Caller will not accept being placed on the waitlist and is requesting a message be sent to doctor.    Name of Caller: pt  When is the first available appointment?   Pt  rescheduling  post   op  rowena .. nothing  until  nov 30 // pt  asking  for a sooner rowena   Symptoms:  post op  Best Call Back Number:  call  pt [phone]  Additional Information:   please call  for a  sooner rowena

## 2020-11-04 NOTE — TELEPHONE ENCOUNTER
Spoke with patient nurse visit scheduled for tomorrow and virtual visit scheduled for tomorrow. Patient verbally voiced understanding.

## 2020-11-04 NOTE — TELEPHONE ENCOUNTER
Please see if pt can come for another pvr tomorrow or Friday if possible and see if he can do a VV tomorrow or monday

## 2020-11-05 ENCOUNTER — OFFICE VISIT (OUTPATIENT)
Dept: UROLOGY | Facility: CLINIC | Age: 70
End: 2020-11-05
Payer: OTHER GOVERNMENT

## 2020-11-05 ENCOUNTER — TELEPHONE (OUTPATIENT)
Dept: UROLOGY | Facility: CLINIC | Age: 70
End: 2020-11-05

## 2020-11-05 ENCOUNTER — CLINICAL SUPPORT (OUTPATIENT)
Dept: UROLOGY | Facility: CLINIC | Age: 70
End: 2020-11-05
Payer: OTHER GOVERNMENT

## 2020-11-05 DIAGNOSIS — Z87.898 HISTORY OF URINARY RETENTION: ICD-10-CM

## 2020-11-05 DIAGNOSIS — N40.0 BENIGN PROSTATIC HYPERPLASIA, UNSPECIFIED WHETHER LOWER URINARY TRACT SYMPTOMS PRESENT: Primary | ICD-10-CM

## 2020-11-05 LAB — POC RESIDUAL URINE VOLUME: 66 ML (ref 0–100)

## 2020-11-05 PROCEDURE — 51798 US URINE CAPACITY MEASURE: CPT | Mod: PBBFAC,PN

## 2020-11-05 PROCEDURE — 99024 POSTOP FOLLOW-UP VISIT: CPT | Mod: 95,,, | Performed by: UROLOGY

## 2020-11-05 PROCEDURE — 99499 UNLISTED E&M SERVICE: CPT | Mod: S$PBB,,, | Performed by: UROLOGY

## 2020-11-05 PROCEDURE — 99024 PR POST-OP FOLLOW-UP VISIT: ICD-10-PCS | Mod: 95,,, | Performed by: UROLOGY

## 2020-11-05 PROCEDURE — 99499 NO LOS: ICD-10-PCS | Mod: S$PBB,,, | Performed by: UROLOGY

## 2020-11-05 NOTE — PROGRESS NOTES
Patient arrived in clinic today per  for a pvr by bladder scan  Patient ambulated to restroom successfully emptied bladder  pvr by bladder scan showed 66ml

## 2020-11-05 NOTE — TELEPHONE ENCOUNTER
----- Message from Shira Burt sent at 11/5/2020  2:40 PM CST -----  Regarding: return call  Contact: self  Type:  Patient Returning Call    Who Called: self  Who Left Message for Patient:  Steph  Does the patient know what this is regarding?:    Best Call Back Number:  [phone]  Additional Information:

## 2020-11-05 NOTE — PATIENT INSTRUCTIONS
S/p turp with h/o retention  Discontinue finasteride  No longer taking flomax   Emptying much better   Still would like to do in and out catheter to ensure no scar tissue at 3 months post procedure  rtc in January (possible pvr by I&O). Check with bladder scan first  Return sooner if having slowing stream or increasing urgency

## 2020-11-05 NOTE — PROGRESS NOTES
Reason for telemed visit: This is a telemedicine visit performed during the COVID 19 isolation  This patient was evaluated during the COVID-19 social distancing period. Work up/disposition may be affected by resource limitations and concerns regarding nosocomial infections.     The patient location is:home  Date of Service: 11/05/2020  The chief complaint leading to consultation is: see hpi and visit diagnosis  Visit type: Virtual visit with REAL TIME AUDIO only     The reason for the audio only service rather than synchronous audio and video virtual visit was related to technical difficulties or patient preference/necessity.    Total time spent with patient: 15. More than half the time was spent counseling or coordinating care.   Date of Service: 11/05/2020      Each patient to whom he or she provides medical services by telemedicine is:    (1) informed of the relationship between the physician and patient and the respective role of any other health care provider with respect to management of the patient; and   (2) notified that he or she may decline to receive medical services by telemedicine and may withdraw from such care at any time.  (3) Patient verbally consented to treatment via phone, according to the clinic consent to treat policies outlined by the registrar    This service was not originating from a related E/M service provided within the previous 7 days nor will  to an E/M service or procedure within the next 24 hours or my soonest available appointment.  Prevailing standard of care was able to be met in this audio-only visit.            [facility] Progress Note - [place]  PCP: MICHELLE adamson  [facility]: inactive    Chief Complaint: No chief complaint on file.      Subjective:        HPI: Eron Turk is a 70 y.o. male presents with     Interval history 10/14/16:     He recently moved from [place]. He found a testicular lump and underwent a scrotal US at [facility]. He was  told he had a cyst in his scroum and on last visit he was found to have an epididymal cyst.      He also c/o occ weak stream. occ hesisistancy. He has frequency q1-2 hours. Nocturia 5x a night. He is on hytrin which he's been on for the past 10 years. occ straining. He has occ UUI. He does not feel like he empties his bladder. i started ditropan at last visit and he states that he is happy with his symptoms. Feels like he is able to hold his urine, denies any leakage. Nocturia decreased to 2-3x a night. Frequency q3-4 hours.     I scheduled him for cysto/trus on 9/12/16. Volume 33.7g and he was found to have an enlarged median lobe. Mod bilobar hypertrophy.     Uroflow results : Mean flow: 4mL/s. Voided volume: 232 mL. PVR 174cc     Plan: pt not interested in suregery. cont ditropan, continue terazosin. F/u in 6 months.     Interval history 8/4/20:     He went to ER with 2d history of urinary retention associated with full bladder and pain with urge. Has been on terazosin and ditropan 10mg XL. He was supposed to f/u in 2016 but they didn't know. He's been having increasing urgency and frequency with UUI requiring depends. Nocturia 4-5x. Wearing 3 depends a day.    Plan: felt retention due to bph, constipation and oab med. rec'd fiber gummies, d/c ditropan, ,d/c terazosin, sart flomax 0.8mg and return for voiding trial. psa (8/12-0.64)    Returned on 8/6 for voiding trial. He had urge to void but could not and pvr by scan was 287. Catheter placed and 300cc drained.     Interval history 8/20/20:   Has been having urge to void     Plan: repeat voiding trial. Cysto trus. cx had grown e.faecalis, had him d/c macrobid and restart 5d prior to cysto trus     Interval history 9/14/20:   Repeat voiding trial on 8/25/20. pvr by I&O: 700c. Significant urge to void at that time    Cysto trus 9/14/20- cystitis. Ball valve median lobe. trus volume 27.7g. biopsy of abnormal tissue. Path returned granulation tissue c/w cystitis.    Plan: cont flomax and finasteride until 4 weeks after procedure. Start abx 7d prior. Schedule turp.    On 9/23/20 preop his wbc was found to be 19.41. had him start augmentin, exchange catheter and obtain culture from new catheter. cx was NG. Repeat wbc came down to 11.     Interval history 9/30/20:   Underwent bipolar turp. Remained overnight.    Path chips:  PROSTATE CHIPS, WEIGHT= 16 GRAMS, TRANSURETHRAL RESECTION:  - Benign prostatic hyperplasia, stromal predominant.  - Extensive cystic and simple glandular atrophy.  - Multifocal basal cell hyperplasia.  - Chronic prostatitis with focal minimal activity.  - No evidence of high-grade prostatic intraepithelial neoplasia (PIN 3) or malignancy    Interval history 11/5/20:   Catheter was removed on 10/7- pvr by scan: 132cc  returned on 10/14/20 and pvr was 16cc  pvr today: 66cc    Still wearing a depends for occ UUI with a few small drops 1-2x a day. Voiding 2-3x a day. 2-3x a night. Some diet coke during day.   Still taking finasteride  No longer taking flomax  Stream is ok, stronger when he passes gas.           psa history - no family hx of prostate cancer  11/5/20 pvr by scan: 132cc  10/14/20 pvr by scan: 16cc  10/7/20 pvr by scan: 132cc   9/30/20 Turp: chips neg  9/14/20 Cysto trus: 27.7g, ball valve median lobe  8/25/20 pvr by io: 700cc  8/12/20 0.64  8/3/20  Cath residual: 1500cc., 8/4/20: difficult to palpate prostate  10/14/16 UF: avg flow 4mL/s, voided vol: 232, pvr: 174cc  9/12/16 Cysto/trus: 33.7g   8/16/16 0.87    Urine history:  9/23/20 ng  9/14/20            e.faecalis, 250 bld/prt+/wbc+  8/10/20            E.faecalis, cath: 3+bld/tr nba, 18 wbc/>100 rbc/mod bact  8/3/20              Cath: neg  10/4/16            Neg  9/12/16            Neg  8/16/16            neg          Past Medical History:   Diagnosis Date    Arthritis     BPH (benign prostatic hypertrophy)     Encounter for blood transfusion     Hypertension     PTSD (post-traumatic stress  disorder)     PUD (peptic ulcer disease)      Past Surgical History:   Procedure Laterality Date    ANKLE SURGERY Left     HERNIA REPAIR      JOINT REPLACEMENT      right knee     stomach ulcer surgery      TRANSRECTAL ULTRASOUND EXAMINATION N/A 2020    Procedure: TRANSRECTAL ULTRASOUND;  Surgeon: Joanna Dowell MD;  [facility];  Service: Urology;  Laterality: N/A;    TRANSURETHRAL RESECTION OF PROSTATE N/A 2020    Procedure: TURP (TRANSURETHRAL RESECTION OF PROSTATE);  Surgeon: Joanna Dowell MD;  Location: [facility] OR;  Service: Urology;  Laterality: N/A;       Cardiology: none  Colonoscopy: Dr.Dibuono Chowdary Hx:   malignancies: No  . Mother  a 86. Father  a 90  kidney stones: Yes - little brother      Soc Hx:  No tobacco.  5 to 6 beers per day - alcohol  L[place] in [place]  :unmarried but with partner Brielle  Children: 3  Occupation: and      Allergies:  Review of patient's allergies indicates no known allergies.     Medications: reviewed   Anticoagulation: Yes - asa 81mg daily    Urologic meds: ditropan, hytrin 10mg  Anticoagulation: Yes - asa 81mg    Objective:     There were no vitals filed for this visit.    No vitals or exam beyond what is listed below performed due to audio visit        Labs:  Recent Labs   Lab 20  1837 20  1052 20  0834   WBC 10.51 19.41 H 11.00   Hemoglobin 13.6 L 14.2 13.8 L   Hematocrit 40.8 41.2 41.7   Platelets 159 205 182   ]  Recent Labs   Lab 20  1837 20  1052   Sodium 141 140   Potassium 4.5 3.8   Chloride 106 104   CO2 24 27   BUN 13 13   Creatinine 0.9 0.8   Glucose 114 H 120 H   Calcium 8.9 8.7   Alkaline Phosphatase  --  57   Total Protein  --  7.0   Albumin  --  3.7   Total Bilirubin  --  0.4   AST  --  14   ALT  --  24   ]    Lab Results   Component Value Date    HGBA1C 5.6 2016       Path: See hpi for recent   none    Rads: See hpi for recent    none    Assessment:       1. Benign prostatic hyperplasia, unspecified whether lower urinary tract symptoms present    2. History of urinary retention          Plan:     S/p turp with h/o retention  Discontinue finasteride  No longer taking flomax   Emptying much better   Still would like to do in and out catheter to ensure no scar tissue at 3 months post procedure  rtc in January (possible pvr by I&O). Check with bladder scan first  Return sooner if having slowing stream or increasing urgency

## 2021-01-05 ENCOUNTER — OFFICE VISIT (OUTPATIENT)
Dept: UROLOGY | Facility: CLINIC | Age: 71
End: 2021-01-05
Payer: OTHER GOVERNMENT

## 2021-01-05 VITALS
HEART RATE: 69 BPM | HEIGHT: 69 IN | SYSTOLIC BLOOD PRESSURE: 156 MMHG | DIASTOLIC BLOOD PRESSURE: 75 MMHG | WEIGHT: 253.88 LBS | BODY MASS INDEX: 37.6 KG/M2

## 2021-01-05 DIAGNOSIS — N40.0 BENIGN PROSTATIC HYPERPLASIA, UNSPECIFIED WHETHER LOWER URINARY TRACT SYMPTOMS PRESENT: Primary | ICD-10-CM

## 2021-01-05 LAB
BILIRUB SERPL-MCNC: NORMAL MG/DL
BLOOD URINE, POC: NORMAL
CLARITY, POC UA: CLEAR
COLOR, POC UA: YELLOW
GLUCOSE UR QL STRIP: NORMAL
KETONES UR QL STRIP: NORMAL
LEUKOCYTE ESTERASE URINE, POC: NORMAL
NITRITE, POC UA: NORMAL
PH, POC UA: 5.5
PROTEIN, POC: NORMAL
SPECIFIC GRAVITY, POC UA: 1.02
UROBILINOGEN, POC UA: NORMAL

## 2021-01-05 PROCEDURE — 51701 INSERT BLADDER CATHETER: CPT | Mod: PBBFAC,PN | Performed by: UROLOGY

## 2021-01-05 PROCEDURE — 51701 PR INSERTION OF NON-INDWELLING BLADDER CATHETERIZATION FOR RESIDUAL UR: ICD-10-PCS | Mod: S$PBB,,, | Performed by: UROLOGY

## 2021-01-05 PROCEDURE — 99214 OFFICE O/P EST MOD 30 MIN: CPT | Mod: PBBFAC,PN | Performed by: UROLOGY

## 2021-01-05 PROCEDURE — 99213 OFFICE O/P EST LOW 20 MIN: CPT | Mod: S$PBB,25,, | Performed by: UROLOGY

## 2021-01-05 PROCEDURE — 99213 PR OFFICE/OUTPT VISIT, EST, LEVL III, 20-29 MIN: ICD-10-PCS | Mod: S$PBB,25,, | Performed by: UROLOGY

## 2021-01-05 PROCEDURE — 51701 INSERT BLADDER CATHETER: CPT | Mod: S$PBB,,, | Performed by: UROLOGY

## 2021-01-05 PROCEDURE — 99999 PR PBB SHADOW E&M-EST. PATIENT-LVL IV: ICD-10-PCS | Mod: PBBFAC,,, | Performed by: UROLOGY

## 2021-01-05 PROCEDURE — 99999 PR PBB SHADOW E&M-EST. PATIENT-LVL IV: CPT | Mod: PBBFAC,,, | Performed by: UROLOGY

## 2021-01-05 PROCEDURE — 81002 URINALYSIS NONAUTO W/O SCOPE: CPT | Mod: PBBFAC,PN | Performed by: UROLOGY

## 2021-04-13 ENCOUNTER — OFFICE VISIT (OUTPATIENT)
Dept: UROLOGY | Facility: CLINIC | Age: 71
End: 2021-04-13
Payer: OTHER GOVERNMENT

## 2021-04-13 VITALS
HEART RATE: 66 BPM | WEIGHT: 260.81 LBS | HEIGHT: 69 IN | DIASTOLIC BLOOD PRESSURE: 69 MMHG | SYSTOLIC BLOOD PRESSURE: 152 MMHG | BODY MASS INDEX: 38.63 KG/M2

## 2021-04-13 DIAGNOSIS — N40.0 BENIGN PROSTATIC HYPERPLASIA, UNSPECIFIED WHETHER LOWER URINARY TRACT SYMPTOMS PRESENT: Primary | ICD-10-CM

## 2021-04-13 LAB
BILIRUB SERPL-MCNC: NORMAL MG/DL
BLOOD URINE, POC: NORMAL
CLARITY, POC UA: CLEAR
COLOR, POC UA: YELLOW
GLUCOSE UR QL STRIP: NORMAL
KETONES UR QL STRIP: NORMAL
LEUKOCYTE ESTERASE URINE, POC: NORMAL
NITRITE, POC UA: NORMAL
PH, POC UA: 6
POC RESIDUAL URINE VOLUME: 0 ML (ref 0–100)
PROTEIN, POC: NORMAL
SPECIFIC GRAVITY, POC UA: 1.03
UROBILINOGEN, POC UA: 0.2

## 2021-04-13 PROCEDURE — 99999 PR PBB SHADOW E&M-EST. PATIENT-LVL III: ICD-10-PCS | Mod: PBBFAC,,, | Performed by: UROLOGY

## 2021-04-13 PROCEDURE — 99999 PR PBB SHADOW E&M-EST. PATIENT-LVL III: CPT | Mod: PBBFAC,,, | Performed by: UROLOGY

## 2021-04-13 PROCEDURE — 99213 OFFICE O/P EST LOW 20 MIN: CPT | Mod: S$PBB,,, | Performed by: UROLOGY

## 2021-04-13 PROCEDURE — 51798 US URINE CAPACITY MEASURE: CPT | Mod: PBBFAC,PN | Performed by: UROLOGY

## 2021-04-13 PROCEDURE — 81002 URINALYSIS NONAUTO W/O SCOPE: CPT | Mod: PBBFAC,PN | Performed by: UROLOGY

## 2021-04-13 PROCEDURE — 99213 PR OFFICE/OUTPT VISIT, EST, LEVL III, 20-29 MIN: ICD-10-PCS | Mod: S$PBB,,, | Performed by: UROLOGY

## 2021-04-13 PROCEDURE — 99213 OFFICE O/P EST LOW 20 MIN: CPT | Mod: PBBFAC,PN | Performed by: UROLOGY

## 2021-04-13 RX ORDER — HYDROCODONE BITARTRATE AND ACETAMINOPHEN 5; 325 MG/1; MG/1
TABLET ORAL
COMMUNITY
Start: 2021-03-03

## 2021-05-06 ENCOUNTER — PATIENT MESSAGE (OUTPATIENT)
Dept: RESEARCH | Facility: HOSPITAL | Age: 71
End: 2021-05-06

## 2024-03-27 NOTE — ANESTHESIA PREPROCEDURE EVALUATION
09/30/2020  Eron Turk is a 69 y.o., male.    Anesthesia Evaluation    I have reviewed the Patient Summary Reports.    I have reviewed the Nursing Notes. I have reviewed the NPO Status.   I have reviewed the Medications.     Review of Systems  Cardiovascular:   Hypertension ECG has been reviewed. Left BBB   Pulmonary:  Pulmonary Normal    Hepatic/GI:   PUD,    Musculoskeletal:   Arthritis     Neurological:  Neurology Normal    Endocrine:  Endocrine Normal    Psych:   Psychiatric History anxiety          Physical Exam  General:  Morbid Obesity    Airway/Jaw/Neck:  Airway Findings: Mouth Opening: Normal Tongue: Normal  General Airway Assessment: Adult  Mallampati: IV  Improves to III with phonation.      Dental:  Dental Findings: Edentulous   Chest/Lungs:  Chest/Lungs Findings: Clear to auscultation, Normal Respiratory Rate         Mental Status:  Mental Status Findings:  Cooperative, Alert and Oriented         Anesthesia Plan  Type of Anesthesia, risks & benefits discussed:  Anesthesia Type:  general  Patient's Preference:   Intra-op Monitoring Plan: standard ASA monitors  Intra-op Monitoring Plan Comments:   Post Op Pain Control Plan:   Post Op Pain Control Plan Comments:   Induction:   IV and Inhalation  Beta Blocker:  Patient is on a Beta-Blocker and has received one dose within the past 24 hours (No further documentation required).       Informed Consent: Patient understands risks and agrees with Anesthesia plan.  Questions answered. Anesthesia consent signed with patient.  ASA Score: 3     Day of Surgery Review of History & Physical:            Ready For Surgery From Anesthesia Perspective.        Other

## (undated) DEVICE — ELECTRODE REM PLYHSV RETURN 9

## (undated) DEVICE — SPONGE SUPER KERLIX 6X6.75IN

## (undated) DEVICE — GLOVE SURG ULTRA TOUCH 7.5

## (undated) DEVICE — BAG LINGEMAN DRAIN UROLOGY

## (undated) DEVICE — GUIDE BIOPSY BIPLANAR 18G

## (undated) DEVICE — SOL 9P NACL IRR PIC IL

## (undated) DEVICE — JELLY KY LUBRICATING 5G PACKET

## (undated) DEVICE — SYR 50ML CATH TIP

## (undated) DEVICE — SLEEVE SCD EXPRESS CALF MEDIUM

## (undated) DEVICE — Device

## (undated) DEVICE — SCRUB HIBICLENS 4% CHG 4OZ

## (undated) DEVICE — DRAPE CYSTOSCOPY 62X40X100IN

## (undated) DEVICE — LEGGINGS X LARGE 6IN CUFF

## (undated) DEVICE — GUIDE WIRE MOTION .035 X 150CM

## (undated) DEVICE — SET IRR URLGY 2LINE UNIV SPIKE

## (undated) DEVICE — GOWN X-LG STERILE BACK

## (undated) DEVICE — SYR 50CC LL

## (undated) DEVICE — COVER TRANSDUCER LATEX N/STERI

## (undated) DEVICE — SEE L#95700

## (undated) DEVICE — SEE MEDLINE ITEM 146313

## (undated) DEVICE — EVACUATOR BLADDER UROVAC ADPT

## (undated) DEVICE — SEE MEDLINE ITEM 107746

## (undated) DEVICE — SYR 10CC LUER LOCK

## (undated) DEVICE — GLOVE SURG ULTRA TOUCH 6

## (undated) DEVICE — CONTAINER SPECIMEN STRL 4OZ

## (undated) DEVICE — BAG URINARY LEG COMBO PACK STA

## (undated) DEVICE — SOL IRR NACL .9% 3000ML

## (undated) DEVICE — ELECTRODE BUGBEE FULGURATING

## (undated) DEVICE — ELECTRODE RESECTION LOOP LRGE

## (undated) DEVICE — SEE MEDLINE ITEM 157116